# Patient Record
Sex: MALE | Race: WHITE | URBAN - METROPOLITAN AREA
[De-identification: names, ages, dates, MRNs, and addresses within clinical notes are randomized per-mention and may not be internally consistent; named-entity substitution may affect disease eponyms.]

---

## 2019-12-16 ENCOUNTER — HOSPITAL ENCOUNTER (OUTPATIENT)
Age: 54
Discharge: HOME | End: 2019-12-18

## 2019-12-16 PROCEDURE — 88305 TISSUE EXAM BY PATHOLOGIST: CPT

## 2020-05-25 ENCOUNTER — HOSPITAL ENCOUNTER (OUTPATIENT)
Dept: SLEEP CENTER | Age: 55
Discharge: HOME | End: 2020-05-25
Payer: COMMERCIAL

## 2020-05-25 PROCEDURE — G0399 HOME SLEEP TEST/TYPE 3 PORTA: HCPCS

## 2020-06-05 NOTE — PROGRESS NOTES
[address] [address]                               SLEEP STUDY REPORT    PATIENT NAME: Claudean Lazier                    :        1965  MED REC NO:   [id number]                            ROOM:  ACCOUNT NO:   [de-identified]                           ADMIT DATE: 2020  PROVIDER:     Riley Dumas    DATE OF STUDY:  2020    HOME SLEEP STUDY    REFERRING PROVIDER:  Tip Hatfield MD.    INDICATIONS FOR STUDY:  Snoring, witnessed apneas, excessive daytime  napping, and nocturnal sweating. Neck circumference is 18.5 inches. [place] sleepiness scale is 9/24 and BMI is 32.6 kg per meter squared. Home sleep study was undertaken on the morning of 2020 utilizing  an ApneaLink recording system with data including respiratory effort,  airflow, pulse oxymetry, pulse, and snoring. Total flow evaluation  period was 5 hours and 9 minutes. Total oxygen evaluation period was 5  hours and 11 minutes. RESPIRATORY DISTURBANCE ANALYSIS:  There were 178 obstructive apneas. There was 1 mixed apnea. Additionally, there were 84 hypopneas. Apnea-hypopnea index was 51.1 per hour. OXYGEN DESATURATION ANALYSIS:  There were 227 desaturations of 4% or  more. Lowest oxygen saturation was 80%. Desaturation index was 43.8  per hour. PULSE RATE ANALYSIS:  Average pulse rate for this study was 68 beats per  minute with minimum of 55 and maximum of 105 beats per minute. SNORING ANALYSIS:  There were 82 snoring events. IMPRESSION:  1. Home sleep study is compatible with severe obstructive sleep apnea. 2.  Mild-to-moderate desaturations are observed in association with  respiratory events. RECOMMENDATIONS:  1. CPAP/bilevel PAP titration. 2.  Avoid driving when sleepy. Avoid alcohol, sedatives, and narcotics,  which may make sleep apnea worse. 3.  Weight loss may be of benefit.         Barbara Cade [address]      D: 06/04/2020 7:26:13       T: 06/05/2020 1:01:31     [email or web address]  Job#: [id number]     Doc#: [id number]    CC:

## 2021-10-27 ENCOUNTER — OFFICE VISIT (OUTPATIENT)
Dept: PODIATRY | Age: 56
End: 2021-10-27
Payer: COMMERCIAL

## 2021-10-27 VITALS
BODY MASS INDEX: 32.25 KG/M2 | DIASTOLIC BLOOD PRESSURE: 82 MMHG | WEIGHT: 315 LBS | SYSTOLIC BLOOD PRESSURE: 128 MMHG | TEMPERATURE: 98.9 F

## 2021-10-27 DIAGNOSIS — M79.675 PAIN IN LEFT TOE(S): ICD-10-CM

## 2021-10-27 DIAGNOSIS — M20.42 HAMMER TOE OF LEFT FOOT: Primary | ICD-10-CM

## 2021-10-27 DIAGNOSIS — M21.612 BUNION OF GREAT TOE OF LEFT FOOT: ICD-10-CM

## 2021-10-27 PROCEDURE — 99203 OFFICE O/P NEW LOW 30 MIN: CPT | Performed by: PODIATRIST

## 2021-10-27 NOTE — PATIENT INSTRUCTIONS
Surgery will be scheduled 1/7/2022 at [facility] in [address]   They will contact you to give you all your preop instructions  Do not eat or drink anything after midnight on 1/6/2022 nothing morning of surgery  Call Kristine Burt if any issues [phone]    Someone needs to drive u to and from the surgery  Do not take nsaids like aspirin, advil  Ibuprofen tylenol is ok  Call Dr. Jazmine Contreras and set up preop clearance history and physical appointment anytime after 12/8/2021 for it to be valid the clearance has to be faxed to Kristine Burt at [phone] office note clearing you for surgery, ekg and any labs done

## 2021-10-27 NOTE — PROGRESS NOTES
[facility] PODIATRY  [address] [facility] [address]  Dept: [phone]  Dept Fax: [phone]    NEW PATIENT PROGRESS NOTE  Date of patient's visit: 10/27/2021  Patient's Name:  Tammi Tamez YOB: 1965            Patient Care Team:  Tracey Leyva MD as PCP - General        Chief Complaint   Patient presents with   [facility] Doctor     referred by another pt presents for toe pain he wear steel toed boots and it raises up and rubs    Toe Pain     nail care saw pcp Dr. Leora Nicole 10/12/2021    Foot Pain     pt wears valeria hose    Hammer Toe    Bunions       HPI  HPI:   Tammi Tamez is a 61 y.o. male who presents to the office today complaining of a bunion hammertoe left foot. This new patient is seen today regarding a bunion hammertoe. 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 .       Allergies   Allergen Reactions    Penicillins Swelling     rash    Sulfa Antibiotics Swelling     rash       Past Medical History:   Diagnosis Date    Asthma     Sleep apnea     Swelling of extremity, left     chronic left leg swelling       Prior to Admission medications    Medication Sig Start Date End Date Taking? Authorizing Provider   dapagliflozin (FARXIGA) 10 MG tablet Take 10 mg by mouth every morning   Yes Historical Provider, MD   atorvastatin (LIPITOR) 10 MG tablet Take 10 mg by mouth daily   Yes Historical Provider, MD       Past Surgical History:   Procedure Laterality Date    APPENDECTOMY      [facility].  INCISION AND DRAINAGE ABSCESS SOFT TISSUE DEEP  9/24/2011         TONSILLECTOMY  2000       No family history on file.     Social History     Tobacco Use    Smoking status: Current Every Day Smoker     Packs/day: 0.50     Years: 20.00 Pack years: 10.00     Types: Cigars    Smokeless tobacco: Never Used   Substance Use Topics    Alcohol use: Yes     Alcohol/week: 1.0 standard drinks     Types: 1 Cans of beer per week       Review of Systems    Review of Systems:   History obtained from chart review and the patient  General ROS: negative for - chills, fatigue, fever, night sweats or weight gain  Constitutional: Negative for chills, diaphoresis, fatigue, fever and unexpected weight change. Musculoskeletal: Positive for . Neurological ROS: negative for - behavioral changes, confusion, headaches or seizures. Negative for weakness and numbness. Dermatological ROS: negative for - mole changes, rash  Cardiovascular: Negative for leg swelling. Gastrointestinal: Negative for constipation, diarrhea, nausea and vomiting. Lower Extremity Physical Examination:   Vitals:   Vitals:    10/27/21 1348   BP: 128/82   Temp: 98.9 °F (37.2 °C)        Foot Exam    General  General Appearance: appears stated age and healthy   Orientation: alert and oriented to person, place, and time       Left Foot/Ankle      Inspection and Palpation  Tenderness: bony tenderness and great toe metatarsophalangeal joint   Hammertoes: second toe  Claw toes: second toe  Hallux valgus: yes  Skin Exam: skin intact; Neurovascular  Dorsalis pedis: 1+  Posterior tibial: 1+  Saphenous nerve sensation: normal  Tibial nerve sensation: normal  Superficial peroneal nerve sensation: normal  Deep peroneal nerve sensation: normal  Sural nerve sensation: normal  Achilles reflex: 2+  Babinski reflex: 2+    Muscle Strength  Ankle dorsiflexion: 5  Ankle plantar flexion: 5  Ankle inversion: 5  Ankle eversion: 5  Great toe extension: 5  Great toe flexion: 5          Left Ankle Exam     Muscle Strength   The patient has normal left ankle strength. Dorsiflexion:  5/5   Plantar flexion:  5/5             General: AAO x 3 in NAD. Dermatologic Exam:  Skin lesion/ulceration Absent . Skin No rashes or nodules noted. .   Musculoskeletal:     1st MPJ ROM within normal limits, Bilateral.    Ankle ROM within normal limits,Bilateral.   HEEL PAIN      TARSAL TUNNEL PAIN      Vascular:     Radiographs:  3 views  foot/ankle:     Asessment: Patient is a 61 y.o. male with:    Diagnosis Orders   1. Hammer toe of left foot  XR FOOT LEFT (MIN 3 VIEWS)   2. Bunion of great toe of left foot  XR FOOT LEFT (MIN 3 VIEWS)   3. Pain in left toe(s)         Plan: Patient examined and evaluated. Current condition and treatment options discussed in detail. Discussed conservative and surgical options with the patient. Treatment options today consisted of verbal and written instructions given to patient. Contact office with any questions/problems/concerns. RTC in 3week(s). I had a long discussion today with the patient about the likely diagnosis and its natural history, physical exam and imaging findings, as well as treatment options. We discussed both surgical and nonsurgical treatments, including risks and benefits. From a nonoperative standpoint, we discussed rest/activity modification, NSAIDs/Acetaminophen/topical anesthetics, orthotics, bracing/immobilization, and physical therapy. After conservative treatment has failed patient agrees and would like to have surgery today, we reviewed all aspects of the surgery including the risks the complications and the postop course there are no guarantees,   Patient agrees with surgery   we reviewed all risk and benefits all complications including anesthesia infection further surgery over under correction patient agrees to proceed with surgery. General the terms and procedures of the treatment were undertaken there was alternative procedures and methods discussed with the patient the patient declined these and opted for surgery. All the risks with the procedure were reviewed.   Patient will have surgery at [facility] in the future surgery would be a Lapidus with an Tuan guillory osteotomy arthrodesis second digit all left foot patient will be need cleared to be cleared by his primary care doc       10/27/2021    Electronically signed by Tavia Crow DPM on 10/27/2021 at 2:49 PM  10/27/2021

## 2021-10-27 NOTE — PROGRESS NOTES
[facility] PODIATRY  [address] [facility] [address]  Dept: [phone]  Dept Fax: [phone]    NEW PATIENT PROGRESS NOTE  Date of patient's visit: 10/27/2021  Patient's Name:  Kassandra Kamara YOB: 1965            Patient Care Team:  Hari Powers MD as PCP - General        Chief Complaint   Patient presents with   [facility] Doctor     referred by another pt presents for toe pain he wear steel toed boots and it raises up and rubs    Toe Pain     nail care saw pcp Dr. Chiqui Jones 10/12/2021    Foot Pain     pt wears valeria hose    Hammer Toe    Bunions       HPI  HPI:   Kassandra Kamara is a 61 y.o. male who presents to the office today complaining of ***. Symptoms began {NUMBER:65414} {TIME FRAME:20321} ago. Patient relates pain is {PRESENT/ABSENT:19725}. Pain is rated [address] [facility] 0-10:366870} out of 10 and is described as {PAIN ASSESSMENT:00812}. Treatments prior to today's visit include: ***. Currently denies F/C/N/V. Pt's primary care physician is Hari Powers MD last seen***     Allergies   Allergen Reactions    Penicillins Swelling     rash    Sulfa Antibiotics Swelling     rash       Past Medical History:   Diagnosis Date    Asthma     Sleep apnea     Swelling of extremity, left     chronic left leg swelling       Prior to Admission medications    Medication Sig Start Date End Date Taking? Authorizing Provider   dapagliflozin (FARXIGA) 10 MG tablet Take 10 mg by mouth every morning   Yes Historical Provider, MD   atorvastatin (LIPITOR) 10 MG tablet Take 10 mg by mouth daily   Yes Historical Provider, MD       Past Surgical History:   Procedure Laterality Date    APPENDECTOMY      [facility].  INCISION AND DRAINAGE ABSCESS SOFT TISSUE DEEP  9/24/2011         TONSILLECTOMY  2000       No family history on file.     Social History     Tobacco Use    Smoking status: Current Every Day Smoker     Packs/day: 0.50 Years: 20.00     Pack years: 10.00     Types: Cigars    Smokeless tobacco: Never Used   Substance Use Topics    Alcohol use: Yes     Alcohol/week: 1.0 standard drinks     Types: 1 Cans of beer per week       Review of Systems    Review of Systems:   History obtained from chart review and the patient  General ROS: negative for - chills, fatigue, fever, night sweats or weight gain  Constitutional: Negative for chills, diaphoresis, fatigue, fever and unexpected weight change. Musculoskeletal: Positive for ***. Neurological ROS: negative for - behavioral changes, confusion, headaches or seizures. Negative for weakness and numbness. Dermatological ROS: negative for - mole changes, rash  Cardiovascular: Negative for leg swelling. Gastrointestinal: Negative for constipation, diarrhea, nausea and vomiting. Lower Extremity Physical Examination:   Vitals:   Vitals:    10/27/21 1348   BP: 128/82   Temp: 98.9 °F (37.2 °C)        Foot Exam  Ortho Exam    General: AAO x 3 in NAD. Dermatologic Exam:  Skin lesion/ulceration {PRESENT/ABSENT:19725}. Skin {SKIN EXAM:20822}. Musculoskeletal:     1st MPJ ROM {DESC; NORMAL/INCREASED/DECREASED:25838}, {RIGHT LEFT BILATERAL:33159}. Ankle ROM {DESC; NORMAL/INCREASED/DECREASED:27503},{RIGHT LEFT BILATERAL:59301}. HEEL PAIN ***     TARSAL TUNNEL PAIN  ***    Vascular:     Radiographs:  3 views *** foot/ankle:     Asessment: Patient is a 61 y.o. male with:    Diagnosis Orders   1. Hammer toe of left foot  XR FOOT LEFT (MIN 3 VIEWS)   2. Bunion of great toe of left foot  XR FOOT LEFT (MIN 3 VIEWS)       Plan: Patient examined and evaluated. Current condition and treatment options discussed in detail. Discussed conservative and surgical options with the patient. Treatment options today consisted of verbal and written instructions given to patient. Contact office with any questions/problems/concerns. RTC in {Numbers; 1-10:53191}{DAYS/WEEKS/MONTHS (D):30202}. 10/27/2021    Electronically signed by Ernesto Yeboah DPM on 10/27/2021 at 2:42 PM  10/27/2021

## 2021-12-09 ENCOUNTER — PREP FOR PROCEDURE (OUTPATIENT)
Dept: PODIATRY | Age: 56
End: 2021-12-09

## 2021-12-09 RX ORDER — SODIUM CHLORIDE 0.9 % (FLUSH) 0.9 %
5-40 SYRINGE (ML) INJECTION PRN
Status: CANCELLED | OUTPATIENT
Start: 2021-12-09

## 2021-12-09 RX ORDER — SODIUM CHLORIDE 0.9 % (FLUSH) 0.9 %
5-40 SYRINGE (ML) INJECTION EVERY 12 HOURS SCHEDULED
Status: CANCELLED | OUTPATIENT
Start: 2021-12-09

## 2021-12-09 RX ORDER — SODIUM CHLORIDE 9 MG/ML
25 INJECTION, SOLUTION INTRAVENOUS PRN
Status: CANCELLED | OUTPATIENT
Start: 2021-12-09

## 2021-12-21 ENCOUNTER — OFFICE VISIT (OUTPATIENT)
Dept: PODIATRY | Age: 56
End: 2021-12-21
Payer: COMMERCIAL

## 2021-12-21 VITALS
WEIGHT: 315 LBS | DIASTOLIC BLOOD PRESSURE: 70 MMHG | BODY MASS INDEX: 32.25 KG/M2 | SYSTOLIC BLOOD PRESSURE: 132 MMHG | TEMPERATURE: 97.9 F

## 2021-12-21 DIAGNOSIS — M79.675 PAIN IN LEFT TOE(S): ICD-10-CM

## 2021-12-21 DIAGNOSIS — M21.612 BUNION OF GREAT TOE OF LEFT FOOT: Primary | ICD-10-CM

## 2021-12-21 DIAGNOSIS — M20.42 HAMMER TOE OF LEFT FOOT: ICD-10-CM

## 2021-12-21 PROCEDURE — 99213 OFFICE O/P EST LOW 20 MIN: CPT | Performed by: PODIATRIST

## 2021-12-21 NOTE — PATIENT INSTRUCTIONS
Need to schedule a preop clearance history and physical appointment with Dr. Torres [place]   He needs to do an ekg and labs and clear you in his office note for the foot surgery  No special paperwork needs filled out   Will need all that faxed to [facility]. at [phone]  Any questions call [facility]. at [phone]

## 2021-12-21 NOTE — PROGRESS NOTES
[facility]  [address] [facility] [address]  Dept: [phone]  Dept Fax: [phone]     PATIENT PROGRESS NOTE  Date of patient's visit: 12/21/2021  Patient's Name:  Marie Stewart YOB: 1965            Patient Care Team:  iDa Maya MD as PCP - General        Chief Complaint   Patient presents with    Foot Pain     preop evaluation for foot sx 1/7/22 pt cannot get in with his family dr for clearance will have to post op sx       Foot Pain   This is a chronic problem. The current episode started more than 1 year ago. HPI:   Marie Stewart is a 61 y.o. male who presents to the office today complaining of a bunion hammertoe left foot. This patient is seen today regarding a bunion hammertoe. 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 .       Allergies   Allergen Reactions    Penicillins Swelling     rash    Sulfa Antibiotics Swelling     rash       Past Medical History:   Diagnosis Date    Asthma     Sleep apnea     Swelling of extremity, left     chronic left leg swelling       Prior to Admission medications    Medication Sig Start Date End Date Taking? Authorizing Provider   dapagliflozin (FARXIGA) 10 MG tablet Take 10 mg by mouth every morning   Yes Historical Provider, MD   atorvastatin (LIPITOR) 10 MG tablet Take 10 mg by mouth daily   Yes Historical Provider, MD       Past Surgical History:   Procedure Laterality Date    APPENDECTOMY      [facility]  INCISION AND DRAINAGE ABSCESS SOFT TISSUE DEEP  9/24/2011         TONSILLECTOMY  2000       No family history on file.     Social History     Tobacco Use    Smoking status: Current Every Day Smoker     Packs/day: 0.50     Years: 20.00     Pack years: 10.00     Types: Cigars    Smokeless tobacco: Never Used   Substance Use Topics    Alcohol use: Yes     Alcohol/week: 1.0 standard drink     Types: 1 Cans of beer per week       Review of Systems    Review of Systems:   History obtained from chart review and the patient  General ROS: negative for - chills, fatigue, fever, night sweats or weight gain  Constitutional: Negative for chills, diaphoresis, fatigue, fever and unexpected weight change. Musculoskeletal: Positive for . Neurological ROS: negative for - behavioral changes, confusion, headaches or seizures. Negative for weakness and numbness. Dermatological ROS: negative for - mole changes, rash  Cardiovascular: Negative for leg swelling. Gastrointestinal: Negative for constipation, diarrhea, nausea and vomiting. Lower Extremity Physical Examination:   Vitals:   Vitals:    12/21/21 0711   BP: 132/70   Temp: 97.9 °F (36.6 °C)        Foot Exam    General  General Appearance: appears stated age and healthy   Orientation: alert and oriented to person, place, and time       Left Foot/Ankle      Inspection and Palpation  Tenderness: bony tenderness and great toe metatarsophalangeal joint   Hammertoes: second toe  Claw toes: second toe  Hallux valgus: yes  Skin Exam: skin intact; Neurovascular  Dorsalis pedis: 1+  Posterior tibial: 1+  Saphenous nerve sensation: normal  Tibial nerve sensation: normal  Superficial peroneal nerve sensation: normal  Deep peroneal nerve sensation: normal  Sural nerve sensation: normal  Achilles reflex: 2+  Babinski reflex: 2+    Muscle Strength  Ankle dorsiflexion: 5  Ankle plantar flexion: 5  Ankle inversion: 5  Ankle eversion: 5  Great toe extension: 5  Great toe flexion: 5          Left Ankle Exam     Muscle Strength   The patient has normal left ankle strength. Dorsiflexion:  5/5   Plantar flexion:  5/5             General: AAO x 3 in NAD. Dermatologic Exam:  Skin lesion/ulceration Absent . Skin No rashes or nodules noted. Ar Gutter Musculoskeletal:     1st MPJ ROM within normal limits, Bilateral.    Ankle ROM within normal limits,Bilateral.   HEEL PAIN      TARSAL TUNNEL PAIN      Vascular:     Radiographs:  3 views  foot/ankle:     Asessment: Patient is a 61 y.o. male with:    Diagnosis Orders   1. Bunion of great toe of left foot     2. Hammer toe of left foot     3. Pain in left toe(s)         Plan: Patient examined and evaluated. Current condition and treatment options discussed in detail. Discussed conservative and surgical options with the patient. Treatment options today consisted of verbal and written instructions given to patient. Contact office with any questions/problems/concerns. RTC in 3week(s). I had a long discussion today with the patient about the likely diagnosis and its natural history, physical exam and imaging findings, as well as treatment options. We discussed both surgical and nonsurgical treatments, including risks and benefits. From a nonoperative standpoint, we discussed rest/activity modification, NSAIDs/Acetaminophen/topical anesthetics, orthotics, bracing/immobilization, and physical therapy. After conservative treatment has failed patient agrees and would like to have surgery today, we reviewed all aspects of the surgery including the risks the complications and the postop course there are no guarantees,   Patient agrees with surgery   we reviewed all risk and benefits all complications including anesthesia infection further surgery over under correction patient agrees to proceed with surgery. General the terms and procedures of the treatment were undertaken there was alternative procedures and methods discussed with the patient the patient declined these and opted for surgery. All the risks with the procedure were reviewed.   Patient will have surgery at [facility] in the future surgery would be a Lapidus with an [place] osteotomy arthrodesis second digit all left foot patient will be need cleared to be cleared by his primary care doc   PT NEEDED  TO GET CLEARANCE FROM PCP       12/21/2021    Electronically signed by Maximino Nina DPM on 12/21/2021 at 7:45 AM  12/21/2021

## 2023-04-20 ENCOUNTER — HOSPITAL ENCOUNTER (EMERGENCY)
Age: 58
Discharge: HOME | End: 2023-04-20
Attending: EMERGENCY MEDICINE
Payer: COMMERCIAL

## 2023-04-20 VITALS
SYSTOLIC BLOOD PRESSURE: 124 MMHG | WEIGHT: 305 LBS | RESPIRATION RATE: 16 BRPM | HEIGHT: 78 IN | HEART RATE: 89 BPM | TEMPERATURE: 99 F | BODY MASS INDEX: 35.29 KG/M2 | DIASTOLIC BLOOD PRESSURE: 72 MMHG | OXYGEN SATURATION: 98 %

## 2023-04-20 DIAGNOSIS — M79.604 RIGHT LEG PAIN: ICD-10-CM

## 2023-04-20 DIAGNOSIS — M79.89 RIGHT LEG SWELLING: Primary | ICD-10-CM

## 2023-04-20 DIAGNOSIS — R79.89 POSITIVE D-DIMER: ICD-10-CM

## 2023-04-20 LAB
ANION GAP SERPL CALCULATED.3IONS-SCNC: 13 MMOL/L (ref 7–16)
BASOPHILS # BLD: 0.06 E9/L (ref 0–0.2)
BASOPHILS NFR BLD: 0.5 % (ref 0–2)
BNP BLD-MCNC: 38 PG/ML (ref 0–125)
BUN SERPL-MCNC: 13 MG/DL (ref 6–20)
CALCIUM SERPL-MCNC: 9.2 MG/DL (ref 8.6–10.2)
CHLORIDE SERPL-SCNC: 102 MMOL/L (ref 98–107)
CO2 SERPL-SCNC: 23 MMOL/L (ref 22–29)
CREAT SERPL-MCNC: 0.7 MG/DL (ref 0.7–1.2)
D DIMER: 3631 NG/ML DDU
EOSINOPHIL # BLD: 0.24 E9/L (ref 0.05–0.5)
EOSINOPHIL NFR BLD: 2.2 % (ref 0–6)
ERYTHROCYTE [DISTWIDTH] IN BLOOD BY AUTOMATED COUNT: 13.9 FL (ref 11.5–15)
GLUCOSE SERPL-MCNC: 118 MG/DL (ref 74–99)
HCT VFR BLD AUTO: 48.4 % (ref 37–54)
HGB BLD-MCNC: 15.9 G/DL (ref 12.5–16.5)
IMM GRANULOCYTES # BLD: 0.06 E9/L
IMM GRANULOCYTES NFR BLD: 0.5 % (ref 0–5)
LYMPHOCYTES # BLD: 2.25 E9/L (ref 1.5–4)
LYMPHOCYTES NFR BLD: 20.5 % (ref 20–42)
MCH RBC QN AUTO: 30.1 PG (ref 26–35)
MCHC RBC AUTO-ENTMCNC: 32.9 % (ref 32–34.5)
MCV RBC AUTO: 91.5 FL (ref 80–99.9)
MONOCYTES # BLD: 0.92 E9/L (ref 0.1–0.95)
MONOCYTES NFR BLD: 8.4 % (ref 2–12)
NEUTROPHILS # BLD: 7.47 E9/L (ref 1.8–7.3)
NEUTS SEG NFR BLD: 67.9 % (ref 43–80)
PLATELET # BLD AUTO: 131 E9/L (ref 130–450)
PMV BLD AUTO: 9.4 FL (ref 7–12)
POTASSIUM SERPL-SCNC: 3.9 MMOL/L (ref 3.5–5)
RBC # BLD AUTO: 5.29 E12/L (ref 3.8–5.8)
SODIUM SERPL-SCNC: 138 MMOL/L (ref 132–146)
WBC # BLD: 11 E9/L (ref 4.5–11.5)

## 2023-04-20 PROCEDURE — 6360000002 HC RX W HCPCS: Performed by: EMERGENCY MEDICINE

## 2023-04-20 PROCEDURE — 85025 COMPLETE CBC W/AUTO DIFF WBC: CPT

## 2023-04-20 PROCEDURE — 99284 EMERGENCY DEPT VISIT MOD MDM: CPT

## 2023-04-20 PROCEDURE — 85378 FIBRIN DEGRADE SEMIQUANT: CPT

## 2023-04-20 PROCEDURE — 83880 ASSAY OF NATRIURETIC PEPTIDE: CPT

## 2023-04-20 PROCEDURE — 80048 BASIC METABOLIC PNL TOTAL CA: CPT

## 2023-04-20 PROCEDURE — 96372 THER/PROPH/DIAG INJ SC/IM: CPT

## 2023-04-20 PROCEDURE — 36415 COLL VENOUS BLD VENIPUNCTURE: CPT

## 2023-04-20 RX ORDER — VALACYCLOVIR HYDROCHLORIDE 1 G/1
1000 TABLET, FILM COATED ORAL 2 TIMES DAILY
COMMUNITY

## 2023-04-20 RX ORDER — NAPROXEN 500 MG/1
500 TABLET ORAL 2 TIMES DAILY
Qty: 14 TABLET | Refills: 0 | Status: SHIPPED | OUTPATIENT
Start: 2023-04-20 | End: 2023-04-27

## 2023-04-20 RX ORDER — LISINOPRIL 5 MG/1
5 TABLET ORAL DAILY
COMMUNITY

## 2023-04-20 RX ORDER — ENOXAPARIN SODIUM 100 MG/ML
1 INJECTION SUBCUTANEOUS ONCE
Status: COMPLETED | OUTPATIENT
Start: 2023-04-20 | End: 2023-04-20

## 2023-04-20 RX ORDER — HYDROCODONE BITARTRATE AND ACETAMINOPHEN 5; 325 MG/1; MG/1
1 TABLET ORAL EVERY 6 HOURS PRN
Qty: 12 TABLET | Refills: 0 | Status: SHIPPED | OUTPATIENT
Start: 2023-04-20 | End: 2023-04-23

## 2023-04-20 RX ADMIN — ENOXAPARIN SODIUM 140 MG: 100 INJECTION SUBCUTANEOUS at 20:36

## 2023-04-20 ASSESSMENT — PAIN SCALES - GENERAL: PAINLEVEL_OUTOF10: 9

## 2023-04-20 ASSESSMENT — PAIN DESCRIPTION - LOCATION: LOCATION: LEG

## 2023-04-20 ASSESSMENT — PAIN DESCRIPTION - PAIN TYPE: TYPE: ACUTE PAIN

## 2023-04-20 ASSESSMENT — LIFESTYLE VARIABLES: HOW OFTEN DO YOU HAVE A DRINK CONTAINING ALCOHOL: MONTHLY OR LESS

## 2023-04-20 ASSESSMENT — PAIN DESCRIPTION - ORIENTATION: ORIENTATION: RIGHT;LOWER;UPPER

## 2023-04-21 ENCOUNTER — HOSPITAL ENCOUNTER (EMERGENCY)
Age: 58
Discharge: HOME | End: 2023-04-21
Attending: EMERGENCY MEDICINE
Payer: COMMERCIAL

## 2023-04-21 ENCOUNTER — TELEPHONE (OUTPATIENT)
Dept: VASCULAR SURGERY | Age: 58
End: 2023-04-21

## 2023-04-21 ENCOUNTER — HOSPITAL ENCOUNTER (OUTPATIENT)
Dept: ULTRASOUND IMAGING | Age: 58
End: 2023-04-21
Payer: COMMERCIAL

## 2023-04-21 ENCOUNTER — HOSPITAL ENCOUNTER (OUTPATIENT)
Age: 58
Discharge: HOME | End: 2023-04-21
Payer: COMMERCIAL

## 2023-04-21 VITALS
TEMPERATURE: 97.6 F | DIASTOLIC BLOOD PRESSURE: 81 MMHG | OXYGEN SATURATION: 97 % | SYSTOLIC BLOOD PRESSURE: 121 MMHG | RESPIRATION RATE: 16 BRPM | HEART RATE: 70 BPM

## 2023-04-21 DIAGNOSIS — R79.89 POSITIVE D-DIMER: ICD-10-CM

## 2023-04-21 DIAGNOSIS — M79.604 RIGHT LEG PAIN: ICD-10-CM

## 2023-04-21 DIAGNOSIS — M79.89 RIGHT LEG SWELLING: ICD-10-CM

## 2023-04-21 DIAGNOSIS — I82.462 ACUTE DEEP VEIN THROMBOSIS (DVT) OF CALF MUSCLE VEIN OF LEFT LOWER EXTREMITY (HCC): Primary | ICD-10-CM

## 2023-04-21 PROCEDURE — 99284 EMERGENCY DEPT VISIT MOD MDM: CPT

## 2023-04-21 PROCEDURE — 96372 THER/PROPH/DIAG INJ SC/IM: CPT

## 2023-04-21 PROCEDURE — 93971 EXTREMITY STUDY: CPT

## 2023-04-21 PROCEDURE — 6360000002 HC RX W HCPCS: Performed by: EMERGENCY MEDICINE

## 2023-04-21 RX ORDER — ENOXAPARIN SODIUM 150 MG/ML
1 INJECTION SUBCUTANEOUS ONCE
Status: COMPLETED | OUTPATIENT
Start: 2023-04-21 | End: 2023-04-21

## 2023-04-21 RX ADMIN — ENOXAPARIN SODIUM 135 MG: 150 INJECTION SUBCUTANEOUS at 09:37

## 2023-04-21 ASSESSMENT — ENCOUNTER SYMPTOMS
DIARRHEA: 0
BACK PAIN: 0
COUGH: 0
EYE REDNESS: 0
EYE DISCHARGE: 0
WHEEZING: 0
SORE THROAT: 0
SINUS PRESSURE: 0
NAUSEA: 0
ABDOMINAL PAIN: 0
VOMITING: 0
SHORTNESS OF BREATH: 0
EYE PAIN: 0

## 2023-04-21 ASSESSMENT — PAIN - FUNCTIONAL ASSESSMENT: PAIN_FUNCTIONAL_ASSESSMENT: 0-10

## 2023-04-21 ASSESSMENT — PAIN SCALES - GENERAL: PAINLEVEL_OUTOF10: 5

## 2023-04-21 NOTE — DISCHARGE INSTRUCTIONS
You are given an outpatient order to have a doppler ultrasound at [facility] Ultrasound Department tomorrow 4/21/2023.   This is a necessary test in order to rule out possible DVT (aka \"blood clot\")

## 2023-04-21 NOTE — ED NOTES
Discharge instructions given. Patient verbalizes understanding. No other noted or stated problems at this time. Patient will follow up with primary care and vascular surgery.      Jayson Hanson RN  04/21/23 2931

## 2023-04-21 NOTE — ED PROVIDER NOTES
not on file. The patients home medications have been reviewed. Allergies: Penicillins and Sulfa antibiotics    -------------------------------------------------- RESULTS -------------------------------------------------  Labs:  No results found for this visit on 04/21/23. Radiology:  No orders to display       ------------------------- NURSING NOTES AND VITALS REVIEWED ---------------------------  Date / Time Roomed:  4/21/2023  9:02 AM  ED Bed Assignment:  16/16    The nursing notes within the ED encounter and vital signs as below have been reviewed. /81   Pulse 70   Temp 97.6 °F (36.4 °C) (Oral)   Resp 16   SpO2 97%   Oxygen Saturation Interpretation: Normal      ------------------------------------------ PROGRESS NOTES ------------------------------------------  I have spoken with the patient and discussed todays results, in addition to providing specific details for the plan of care and counseling regarding the diagnosis and prognosis. Their questions are answered at this time and they are agreeable with the plan. I discussed at length with them reasons for immediate return here for re evaluation. They will followup with their primary care physician by calling their office on Monday.      --------------------------------- ADDITIONAL PROVIDER NOTES ---------------------------------  At this time the patient is without objective evidence of an acute process requiring hospitalization or inpatient management. They have remained hemodynamically stable throughout their entire ED visit and are stable for discharge with outpatient follow-up. The plan has been discussed in detail and they are aware of the specific conditions for emergent return, as well as the importance of follow-up. New Prescriptions    APIXABAN STARTER PACK (ELIQUIS DVT/PE STARTER PACK) 5 MG TBPK TABLET    Take 1 tablet by mouth See Admin Instructions       Diagnosis:  1.  Acute deep vein thrombosis (DVT) of calf

## 2023-04-21 NOTE — ED PROVIDER NOTES
Glom Filt Rate >60 >=60 mL/min/1.73    Calcium 9.2 8.6 - 10.2 mg/dL   Brain Natriuretic Peptide   Result Value Ref Range    Pro-BNP 38 0 - 125 pg/mL   CBC with Auto Differential   Result Value Ref Range    WBC 11.0 4.5 - 11.5 E9/L    RBC 5.29 3.80 - 5.80 E12/L    Hemoglobin 15.9 12.5 - 16.5 g/dL    Hematocrit 48.4 37.0 - 54.0 %    MCV 91.5 80.0 - 99.9 fL    MCH 30.1 26.0 - 35.0 pg    MCHC 32.9 32.0 - 34.5 %    RDW 13.9 11.5 - 15.0 fL    Platelets 033 438 - 242 E9/L    MPV 9.4 7.0 - 12.0 fL    Neutrophils % 67.9 43.0 - 80.0 %    Immature Granulocytes % 0.5 0.0 - 5.0 %    Lymphocytes % 20.5 20.0 - 42.0 %    Monocytes % 8.4 2.0 - 12.0 %    Eosinophils % 2.2 0.0 - 6.0 %    Basophils % 0.5 0.0 - 2.0 %    Neutrophils Absolute 7.47 (H) 1.80 - 7.30 E9/L    Immature Granulocytes # 0.06 E9/L    Lymphocytes Absolute 2.25 1.50 - 4.00 E9/L    Monocytes Absolute 0.92 0.10 - 0.95 E9/L    Eosinophils Absolute 0.24 0.05 - 0.50 E9/L    Basophils Absolute 0.06 0.00 - 0.20 E9/L   D-Dimer, Quantitative   Result Value Ref Range    D-Dimer, Quant 3631 ng/mL DDU       RADIOLOGY:  Interpreted by Radiologist.  No orders to display       ------------------------- NURSING NOTES AND VITALS REVIEWED ---------------------------    The nursing notes within the ED encounter and vital signs as below have been reviewed. /72   Pulse 89   Temp 99 °F (37.2 °C) (Oral)   Resp 16   Ht 6' 11\" (2.108 m)   Wt (!) 305 lb (138.3 kg)   SpO2 98%   BMI 31.13 kg/m²   Oxygen Saturation Interpretation: Normal    ---------------------------------------------------PHYSICAL EXAM--------------------------------------    Constitutional/General: Alert and oriented x3, well appearing, non toxic in mild distress  Head: Normocephalic and atraumatic  Eyes: PERRL, EOMI  Mouth: Oropharynx clear, handling secretions, no trismus  Neck: Supple, full ROM,   Pulmonary: Lungs clear to auscultation bilaterally, no wheezes, rales, or rhonchi.  Not in respiratory

## 2023-04-21 NOTE — TELEPHONE ENCOUNTER
Patient was referred to Dr. Nic Alexander from the ER for DVT.   Scheduled appointment on 6-19-23 at 8:30 am.

## 2023-04-30 ENCOUNTER — HOSPITAL ENCOUNTER (EMERGENCY)
Age: 58
Discharge: HOME | End: 2023-04-30
Attending: EMERGENCY MEDICINE
Payer: COMMERCIAL

## 2023-04-30 VITALS
HEART RATE: 75 BPM | OXYGEN SATURATION: 97 % | DIASTOLIC BLOOD PRESSURE: 70 MMHG | WEIGHT: 300 LBS | BODY MASS INDEX: 34.71 KG/M2 | TEMPERATURE: 98.4 F | HEIGHT: 78 IN | SYSTOLIC BLOOD PRESSURE: 123 MMHG | RESPIRATION RATE: 17 BRPM

## 2023-04-30 DIAGNOSIS — L03.115 CELLULITIS OF RIGHT LOWER EXTREMITY: Primary | ICD-10-CM

## 2023-04-30 DIAGNOSIS — I82.401 ACUTE DEEP VEIN THROMBOSIS (DVT) OF RIGHT LOWER EXTREMITY, UNSPECIFIED VEIN (HCC): ICD-10-CM

## 2023-04-30 LAB
ANION GAP SERPL CALCULATED.3IONS-SCNC: 9 MMOL/L (ref 7–16)
BASOPHILS # BLD: 0.09 E9/L (ref 0–0.2)
BASOPHILS NFR BLD: 1.1 % (ref 0–2)
BUN SERPL-MCNC: 17 MG/DL (ref 6–20)
CALCIUM SERPL-MCNC: 9.3 MG/DL (ref 8.6–10.2)
CHLORIDE SERPL-SCNC: 103 MMOL/L (ref 98–107)
CO2 SERPL-SCNC: 28 MMOL/L (ref 22–29)
CREAT SERPL-MCNC: 0.8 MG/DL (ref 0.7–1.2)
EOSINOPHIL # BLD: 0.38 E9/L (ref 0.05–0.5)
EOSINOPHIL NFR BLD: 4.8 % (ref 0–6)
ERYTHROCYTE [DISTWIDTH] IN BLOOD BY AUTOMATED COUNT: 13.2 FL (ref 11.5–15)
GLUCOSE SERPL-MCNC: 163 MG/DL (ref 74–99)
HCT VFR BLD AUTO: 44.1 % (ref 37–54)
HGB BLD-MCNC: 14.7 G/DL (ref 12.5–16.5)
IMM GRANULOCYTES # BLD: 0.03 E9/L
IMM GRANULOCYTES NFR BLD: 0.4 % (ref 0–5)
LACTATE BLDV-SCNC: 2 MMOL/L (ref 0.5–2.2)
LYMPHOCYTES # BLD: 2.14 E9/L (ref 1.5–4)
LYMPHOCYTES NFR BLD: 27.1 % (ref 20–42)
MCH RBC QN AUTO: 31.3 PG (ref 26–35)
MCHC RBC AUTO-ENTMCNC: 33.3 % (ref 32–34.5)
MCV RBC AUTO: 93.8 FL (ref 80–99.9)
MONOCYTES # BLD: 0.54 E9/L (ref 0.1–0.95)
MONOCYTES NFR BLD: 6.8 % (ref 2–12)
NEUTROPHILS # BLD: 4.72 E9/L (ref 1.8–7.3)
NEUTS SEG NFR BLD: 59.8 % (ref 43–80)
PLATELET # BLD AUTO: 229 E9/L (ref 130–450)
PMV BLD AUTO: 8.9 FL (ref 7–12)
POTASSIUM SERPL-SCNC: 4.3 MMOL/L (ref 3.5–5)
RBC # BLD AUTO: 4.7 E12/L (ref 3.8–5.8)
SODIUM SERPL-SCNC: 140 MMOL/L (ref 132–146)
WBC # BLD: 7.9 E9/L (ref 4.5–11.5)

## 2023-04-30 PROCEDURE — 85025 COMPLETE CBC W/AUTO DIFF WBC: CPT

## 2023-04-30 PROCEDURE — 80048 BASIC METABOLIC PNL TOTAL CA: CPT

## 2023-04-30 PROCEDURE — 99284 EMERGENCY DEPT VISIT MOD MDM: CPT

## 2023-04-30 PROCEDURE — 36415 COLL VENOUS BLD VENIPUNCTURE: CPT

## 2023-04-30 PROCEDURE — 83605 ASSAY OF LACTIC ACID: CPT

## 2023-04-30 RX ORDER — HYDROCODONE BITARTRATE AND ACETAMINOPHEN 5; 325 MG/1; MG/1
1 TABLET ORAL EVERY 6 HOURS PRN
Qty: 12 TABLET | Refills: 0 | Status: SHIPPED | OUTPATIENT
Start: 2023-04-30 | End: 2023-05-03

## 2023-04-30 RX ORDER — CEPHALEXIN 500 MG/1
500 CAPSULE ORAL 4 TIMES DAILY
Qty: 28 CAPSULE | Refills: 0 | Status: SHIPPED | OUTPATIENT
Start: 2023-04-30 | End: 2023-05-07

## 2023-06-19 ENCOUNTER — OFFICE VISIT (OUTPATIENT)
Dept: VASCULAR SURGERY | Age: 58
End: 2023-06-19
Payer: COMMERCIAL

## 2023-06-19 VITALS — WEIGHT: 305 LBS | BODY MASS INDEX: 35.29 KG/M2 | HEIGHT: 78 IN

## 2023-06-19 DIAGNOSIS — I82.491 ACUTE DEEP VEIN THROMBOSIS (DVT) OF OTHER SPECIFIED VEIN OF RIGHT LOWER EXTREMITY (HCC): Primary | ICD-10-CM

## 2023-06-19 PROCEDURE — 99203 OFFICE O/P NEW LOW 30 MIN: CPT

## 2023-06-19 RX ORDER — TADALAFIL 20 MG/1
20 TABLET ORAL PRN
COMMUNITY

## 2023-06-19 RX ORDER — HYDROCODONE BITARTRATE AND ACETAMINOPHEN 5; 325 MG/1; MG/1
1 TABLET ORAL EVERY 6 HOURS PRN
COMMUNITY

## 2024-02-15 NOTE — PROGRESS NOTES
tablet by mouth 2 times daily      naproxen (NAPROSYN) 500 MG tablet Take 1 tablet by mouth 2 times daily for 7 days 14 tablet 0    dapagliflozin (FARXIGA) 10 MG tablet Take 1 tablet by mouth every morning      atorvastatin (LIPITOR) 10 MG tablet Take 1 tablet by mouth daily       No current facility-administered medications for this visit. Allergies:  Penicillins and Sulfa antibiotics  Social History     Socioeconomic History    Marital status: Single     Spouse name: Not on file    Number of children: Not on file    Years of education: Not on file    Highest education level: Not on file   Occupational History    Not on file   Tobacco Use    Smoking status: Every Day     Packs/day: 0.50     Years: 20.00     Pack years: 10.00     Types: Cigars, Cigarettes    Smokeless tobacco: Never   Substance and Sexual Activity    Alcohol use: Yes     Alcohol/week: 1.0 standard drink     Types: 1 Cans of beer per week    Drug use: No    Sexual activity: Not on file   Other Topics Concern    Not on file   Social History Narrative    Not on file     Social Determinants of Health     Financial Resource Strain: Not on file   Food Insecurity: Not on file   Transportation Needs: Not on file   Physical Activity: Not on file   Stress: Not on file   Social Connections: Not on file   Intimate Partner Violence: Not on file   Housing Stability: Not on file     No family history on file.   - negative  for DVT   - negative  for clotting disorders  Labs  Lab Results   Component Value Date    WBC 7.9 04/30/2023    HGB 14.7 04/30/2023    HCT 44.1 04/30/2023     04/30/2023    PROTIME 11.4 09/18/2011    INR 1.0 09/18/2011    APTT 26.6 09/18/2011    K 4.3 04/30/2023    BUN 17 04/30/2023    CREATININE 0.8 04/30/2023     PHYSICAL EXAM:    Ht 6' 11\" (2.108 m)   Wt (!) 305 lb (138.3 kg)   BMI 31.13 kg/m²   CONSTITUTIONAL:   Awake, alert, cooperative  PSYCHIATRIC :  Oriented to time, place and person     Appropriate insight to disease
No

## 2024-03-27 ENCOUNTER — APPOINTMENT (OUTPATIENT)
Dept: GENERAL RADIOLOGY | Age: 59
DRG: 178 | End: 2024-03-27
Payer: COMMERCIAL

## 2024-03-27 ENCOUNTER — APPOINTMENT (OUTPATIENT)
Dept: ULTRASOUND IMAGING | Age: 59
DRG: 178 | End: 2024-03-27
Payer: COMMERCIAL

## 2024-03-27 ENCOUNTER — HOSPITAL ENCOUNTER (INPATIENT)
Age: 59
LOS: 6 days | Discharge: HOME | DRG: 178 | End: 2024-04-02
Attending: EMERGENCY MEDICINE | Admitting: INTERNAL MEDICINE
Payer: COMMERCIAL

## 2024-03-27 ENCOUNTER — APPOINTMENT (OUTPATIENT)
Dept: CT IMAGING | Age: 59
DRG: 178 | End: 2024-03-27
Payer: COMMERCIAL

## 2024-03-27 DIAGNOSIS — I82.4Y1 ACUTE DEEP VEIN THROMBOSIS (DVT) OF PROXIMAL VEIN OF RIGHT LOWER EXTREMITY (HCC): Primary | ICD-10-CM

## 2024-03-27 DIAGNOSIS — M71.21 SYNOVIAL CYST OF RIGHT POPLITEAL SPACE: ICD-10-CM

## 2024-03-27 DIAGNOSIS — U07.1 COVID: ICD-10-CM

## 2024-03-27 PROBLEM — I10 PRIMARY HYPERTENSION: Status: ACTIVE | Noted: 2024-03-27

## 2024-03-27 PROBLEM — E78.5 HYPERLIPIDEMIA: Status: ACTIVE | Noted: 2024-03-27

## 2024-03-27 PROBLEM — I82.409 DEEP VEIN THROMBOSIS (DVT) OF LOWER EXTREMITY (HCC): Status: ACTIVE | Noted: 2024-03-27

## 2024-03-27 LAB
ALBUMIN SERPL-MCNC: 4.2 G/DL (ref 3.5–5.2)
ALP SERPL-CCNC: 102 U/L (ref 40–129)
ALT SERPL-CCNC: 27 U/L (ref 0–40)
ANION GAP SERPL CALCULATED.3IONS-SCNC: 10 MMOL/L (ref 7–16)
AST SERPL-CCNC: 26 U/L (ref 0–39)
BASOPHILS # BLD: 0 K/UL (ref 0–0.2)
BASOPHILS NFR BLD: 0 % (ref 0–2)
BILIRUB SERPL-MCNC: 0.4 MG/DL (ref 0–1.2)
BUN SERPL-MCNC: 13 MG/DL (ref 6–20)
CALCIUM SERPL-MCNC: 9.3 MG/DL (ref 8.6–10.2)
CHLORIDE SERPL-SCNC: 102 MMOL/L (ref 98–107)
CO2 SERPL-SCNC: 25 MMOL/L (ref 22–29)
CREAT SERPL-MCNC: 0.9 MG/DL (ref 0.7–1.2)
CRP SERPL HS-MCNC: 4 MG/L (ref 0–5)
EKG ATRIAL RATE: 85 BPM
EKG P AXIS: 77 DEGREES
EKG P-R INTERVAL: 180 MS
EKG Q-T INTERVAL: 318 MS
EKG QRS DURATION: 80 MS
EKG QTC CALCULATION (BAZETT): 378 MS
EKG R AXIS: 83 DEGREES
EKG T AXIS: 77 DEGREES
EKG VENTRICULAR RATE: 85 BPM
EOSINOPHIL # BLD: 0.08 K/UL (ref 0.05–0.5)
EOSINOPHILS RELATIVE PERCENT: 2 % (ref 0–6)
ERYTHROCYTE [DISTWIDTH] IN BLOOD BY AUTOMATED COUNT: 14.2 % (ref 11.5–15)
ERYTHROCYTE [SEDIMENTATION RATE] IN BLOOD BY WESTERGREN METHOD: 5 MM/HR (ref 0–15)
GFR SERPL CREATININE-BSD FRML MDRD: >90 ML/MIN/1.73M2
GLUCOSE SERPL-MCNC: 125 MG/DL (ref 74–99)
HCT VFR BLD AUTO: 46.3 % (ref 37–54)
HGB BLD-MCNC: 15.4 G/DL (ref 12.5–16.5)
INFLUENZA A BY PCR: NOT DETECTED
INFLUENZA B BY PCR: NOT DETECTED
LACTATE BLDV-SCNC: 1.8 MMOL/L (ref 0.5–2.2)
LYMPHOCYTES NFR BLD: 0.33 K/UL (ref 1.5–4)
LYMPHOCYTES RELATIVE PERCENT: 7 % (ref 20–42)
MCH RBC QN AUTO: 32 PG (ref 26–35)
MCHC RBC AUTO-ENTMCNC: 33.3 G/DL (ref 32–34.5)
MCV RBC AUTO: 96.1 FL (ref 80–99.9)
MONOCYTES NFR BLD: 0.21 K/UL (ref 0.1–0.95)
MONOCYTES NFR BLD: 4 % (ref 2–12)
NEUTROPHILS NFR BLD: 87 % (ref 43–80)
NEUTS SEG NFR BLD: 4.08 K/UL (ref 1.8–7.3)
PLATELET, FLUORESCENCE: 134 K/UL (ref 130–450)
PMV BLD AUTO: 9.6 FL (ref 7–12)
POTASSIUM SERPL-SCNC: 4.2 MMOL/L (ref 3.5–5)
PROCALCITONIN SERPL-MCNC: 0.06 NG/ML (ref 0–0.08)
PROT SERPL-MCNC: 6.7 G/DL (ref 6.4–8.3)
RBC # BLD AUTO: 4.82 M/UL (ref 3.8–5.8)
RBC # BLD: NORMAL 10*6/UL
SARS-COV-2 RDRP RESP QL NAA+PROBE: DETECTED
SODIUM SERPL-SCNC: 137 MMOL/L (ref 132–146)
SPECIMEN DESCRIPTION: ABNORMAL
TROPONIN I SERPL HS-MCNC: 9 NG/L (ref 0–11)
WBC OTHER # BLD: 4.7 K/UL (ref 4.5–11.5)

## 2024-03-27 PROCEDURE — 71275 CT ANGIOGRAPHY CHEST: CPT

## 2024-03-27 PROCEDURE — 99285 EMERGENCY DEPT VISIT HI MDM: CPT

## 2024-03-27 PROCEDURE — 6370000000 HC RX 637 (ALT 250 FOR IP): Performed by: INTERNAL MEDICINE

## 2024-03-27 PROCEDURE — 80053 COMPREHEN METABOLIC PANEL: CPT

## 2024-03-27 PROCEDURE — 93971 EXTREMITY STUDY: CPT

## 2024-03-27 PROCEDURE — 71045 X-RAY EXAM CHEST 1 VIEW: CPT

## 2024-03-27 PROCEDURE — 93005 ELECTROCARDIOGRAM TRACING: CPT

## 2024-03-27 PROCEDURE — 6370000000 HC RX 637 (ALT 250 FOR IP): Performed by: EMERGENCY MEDICINE

## 2024-03-27 PROCEDURE — 87635 SARS-COV-2 COVID-19 AMP PRB: CPT

## 2024-03-27 PROCEDURE — 6360000004 HC RX CONTRAST MEDICATION: Performed by: RADIOLOGY

## 2024-03-27 PROCEDURE — APPSS45 APP SPLIT SHARED TIME 31-45 MINUTES: Performed by: NURSE PRACTITIONER

## 2024-03-27 PROCEDURE — 2580000003 HC RX 258

## 2024-03-27 PROCEDURE — 84145 PROCALCITONIN (PCT): CPT

## 2024-03-27 PROCEDURE — 93010 ELECTROCARDIOGRAM REPORT: CPT | Performed by: INTERNAL MEDICINE

## 2024-03-27 PROCEDURE — 6360000002 HC RX W HCPCS: Performed by: EMERGENCY MEDICINE

## 2024-03-27 PROCEDURE — 87502 INFLUENZA DNA AMP PROBE: CPT

## 2024-03-27 PROCEDURE — 85652 RBC SED RATE AUTOMATED: CPT

## 2024-03-27 PROCEDURE — 84484 ASSAY OF TROPONIN QUANT: CPT

## 2024-03-27 PROCEDURE — 99223 1ST HOSP IP/OBS HIGH 75: CPT | Performed by: INTERNAL MEDICINE

## 2024-03-27 PROCEDURE — 96372 THER/PROPH/DIAG INJ SC/IM: CPT

## 2024-03-27 PROCEDURE — 85025 COMPLETE CBC W/AUTO DIFF WBC: CPT

## 2024-03-27 PROCEDURE — 1200000000 HC SEMI PRIVATE

## 2024-03-27 PROCEDURE — 86140 C-REACTIVE PROTEIN: CPT

## 2024-03-27 PROCEDURE — 83605 ASSAY OF LACTIC ACID: CPT

## 2024-03-27 RX ORDER — ACETAMINOPHEN 325 MG/1
650 TABLET ORAL ONCE
Status: COMPLETED | OUTPATIENT
Start: 2024-03-27 | End: 2024-03-27

## 2024-03-27 RX ORDER — ONDANSETRON 4 MG/1
4 TABLET, ORALLY DISINTEGRATING ORAL EVERY 8 HOURS PRN
Status: DISCONTINUED | OUTPATIENT
Start: 2024-03-27 | End: 2024-04-02 | Stop reason: HOSPADM

## 2024-03-27 RX ORDER — ACETAMINOPHEN 325 MG/1
650 TABLET ORAL EVERY 6 HOURS PRN
Status: DISCONTINUED | OUTPATIENT
Start: 2024-03-27 | End: 2024-04-02 | Stop reason: HOSPADM

## 2024-03-27 RX ORDER — SODIUM CHLORIDE 0.9 % (FLUSH) 0.9 %
5-40 SYRINGE (ML) INJECTION EVERY 12 HOURS SCHEDULED
Status: DISCONTINUED | OUTPATIENT
Start: 2024-03-27 | End: 2024-04-02 | Stop reason: HOSPADM

## 2024-03-27 RX ORDER — VALACYCLOVIR HYDROCHLORIDE 1 G/1
1000 TABLET, FILM COATED ORAL DAILY PRN
Status: DISCONTINUED | OUTPATIENT
Start: 2024-03-27 | End: 2024-04-02 | Stop reason: HOSPADM

## 2024-03-27 RX ORDER — POLYETHYLENE GLYCOL 3350 17 G/17G
17 POWDER, FOR SOLUTION ORAL DAILY PRN
Status: DISCONTINUED | OUTPATIENT
Start: 2024-03-27 | End: 2024-04-02 | Stop reason: HOSPADM

## 2024-03-27 RX ORDER — HYDROCODONE BITARTRATE AND ACETAMINOPHEN 5; 325 MG/1; MG/1
1 TABLET ORAL EVERY 6 HOURS PRN
Status: DISCONTINUED | OUTPATIENT
Start: 2024-03-27 | End: 2024-04-02 | Stop reason: HOSPADM

## 2024-03-27 RX ORDER — 0.9 % SODIUM CHLORIDE 0.9 %
1000 INTRAVENOUS SOLUTION INTRAVENOUS ONCE
Status: COMPLETED | OUTPATIENT
Start: 2024-03-27 | End: 2024-03-27

## 2024-03-27 RX ORDER — ONDANSETRON 2 MG/ML
4 INJECTION INTRAMUSCULAR; INTRAVENOUS EVERY 6 HOURS PRN
Status: DISCONTINUED | OUTPATIENT
Start: 2024-03-27 | End: 2024-04-02 | Stop reason: HOSPADM

## 2024-03-27 RX ORDER — SODIUM CHLORIDE 0.9 % (FLUSH) 0.9 %
5-40 SYRINGE (ML) INJECTION PRN
Status: DISCONTINUED | OUTPATIENT
Start: 2024-03-27 | End: 2024-04-02 | Stop reason: HOSPADM

## 2024-03-27 RX ORDER — ENOXAPARIN SODIUM 150 MG/ML
1 INJECTION SUBCUTANEOUS ONCE
Status: COMPLETED | OUTPATIENT
Start: 2024-03-27 | End: 2024-03-27

## 2024-03-27 RX ORDER — SODIUM CHLORIDE 9 MG/ML
INJECTION, SOLUTION INTRAVENOUS PRN
Status: DISCONTINUED | OUTPATIENT
Start: 2024-03-27 | End: 2024-04-02 | Stop reason: HOSPADM

## 2024-03-27 RX ORDER — ACETAMINOPHEN 650 MG/1
650 SUPPOSITORY RECTAL EVERY 6 HOURS PRN
Status: DISCONTINUED | OUTPATIENT
Start: 2024-03-27 | End: 2024-04-02 | Stop reason: HOSPADM

## 2024-03-27 RX ADMIN — ENOXAPARIN SODIUM 135 MG: 150 INJECTION SUBCUTANEOUS at 15:41

## 2024-03-27 RX ADMIN — HYDROCODONE BITARTRATE AND ACETAMINOPHEN 1 TABLET: 5; 325 TABLET ORAL at 20:27

## 2024-03-27 RX ADMIN — ACETAMINOPHEN 650 MG: 325 TABLET ORAL at 13:31

## 2024-03-27 RX ADMIN — SODIUM CHLORIDE 1000 ML: 9 INJECTION, SOLUTION INTRAVENOUS at 11:40

## 2024-03-27 RX ADMIN — IOPAMIDOL 75 ML: 755 INJECTION, SOLUTION INTRAVENOUS at 14:04

## 2024-03-27 ASSESSMENT — PAIN DESCRIPTION - DESCRIPTORS
DESCRIPTORS: ACHING
DESCRIPTORS: ACHING

## 2024-03-27 ASSESSMENT — PAIN SCALES - GENERAL
PAINLEVEL_OUTOF10: 5
PAINLEVEL_OUTOF10: 7
PAINLEVEL_OUTOF10: 5
PAINLEVEL_OUTOF10: 4

## 2024-03-27 ASSESSMENT — PAIN - FUNCTIONAL ASSESSMENT
PAIN_FUNCTIONAL_ASSESSMENT: 0-10
PAIN_FUNCTIONAL_ASSESSMENT: 0-10

## 2024-03-27 ASSESSMENT — LIFESTYLE VARIABLES
HOW OFTEN DO YOU HAVE A DRINK CONTAINING ALCOHOL: MONTHLY OR LESS
HOW MANY STANDARD DRINKS CONTAINING ALCOHOL DO YOU HAVE ON A TYPICAL DAY: 1 OR 2

## 2024-03-27 ASSESSMENT — PAIN DESCRIPTION - LOCATION
LOCATION: LEG
LOCATION: GENERALIZED

## 2024-03-27 ASSESSMENT — PAIN DESCRIPTION - ORIENTATION: ORIENTATION: RIGHT;LEFT

## 2024-03-27 NOTE — ED PROVIDER NOTES
Department of Emergency Medicine   ED Provider Note  Admit Date/RoomTime: 3/27/2024 10:43 AM  ED Room: 0516/0516-A          History of Present Illness:  3/27/24, Time: 11:17 AM EDT       Pa Lugo is a 58 y.o. male presenting to the ED for chills, pain behind his knee and bodyaches.  Complaining of chest pain, abdominal pain, chills, ear pain, pressure in his ears, congestion, cough, and pain behind his right knee.  Notes the pain behind his right knee feels similar to when he had a DVT in the past however he is on Eliquis and has not missed any doses. He denies any fevers, dysuria, hematuria, leg swelling, constipation or diarrhea. Notes sx all onset last night while laying down for bed.     Review of Systems:     Pertinent positives and negatives are stated within HPI.      --------------------------------------------- PAST HISTORY ---------------------------------------------  Past Medical History:  has a past medical history of Asthma, Sleep apnea, and Swelling of extremity, left.    Past Surgical History:  has a past surgical history that includes Appendectomy; Tonsillectomy (01/01/2000); Inc And Drain Abscess Soft Tiss Deep (09/24/2011); Cataract extraction; and Dental surgery.    Social History:  reports that he has been smoking cigars and cigarettes. He has a 10.0 pack-year smoking history. He has never used smokeless tobacco. He reports current alcohol use of about 1.0 standard drink of alcohol per week. He reports that he does not use drugs.    Family History: family history is not on file.     The patient’s home medications have been reviewed.    Allergies: Pcn [penicillins] and Sulfa antibiotics      ---------------------------------------------------PHYSICAL EXAM--------------------------------------    Physical Exam  Vitals and nursing note reviewed.   Constitutional:       General: He is not in acute distress.     Appearance: Normal appearance.   HENT:      Head: Normocephalic and atraumatic.

## 2024-03-27 NOTE — PROGRESS NOTES
4 Eyes Skin Assessment     NAME:  Pa Lugo  YOB: 1965  MEDICAL RECORD NUMBER:  [id number]    The patient is being assessed for  Admission    I agree that at least one RN has performed a thorough Head to Toe Skin Assessment on the patient. ALL assessment sites listed below have been assessed.      Areas assessed by both nurses:    Head, Face, Ears, Shoulders, Back, Chest, Arms, Elbows, Hands, Sacrum. Buttock, Coccyx, Ischium, Legs. Feet and Heels, and Under Medical Devices         Does the Patient have a Wound? No noted wound(s)       Ganesh Prevention initiated by RN: No  Wound Care Orders initiated by RN: No    Pressure Injury (Stage 3,4, Unstageable, DTI, NWPT, and Complex wounds) if present, place Wound referral order by RN under : No    BLE Vascular discoloration, boggy heels, redness to bony prominence on b/l feet    New Ostomies, if present place, Ostomy referral order under : No     Nurse 1 eSignature: Electronically signed by Courtney Alvarez RN on 3/27/24 at 7:33 PM EDT    **SHARE this note so that the co-signing nurse can place an eSignature**    Nurse 2 eSignature: Electronically signed by Shirin Weiss RN on 3/27/24 at 8:35 PM EDT

## 2024-03-27 NOTE — ED NOTES
ED to Inpatient Handoff Report    Notified 5W Nadege that electronic handoff available and patient ready for transport to room 516.    Safety Risks: None identified    Patient in Restraints: no    Constant Observer or Patient : no    Telemetry Monitoring Ordered: No          Order to transfer to unit without monitor: NA    Last MEWS: 1 Time completed: 1755    Deterioration Index: 17.26    Vitals:    03/27/24 1041 03/27/24 1544 03/27/24 1642 03/27/24 1755   BP: 135/70 116/66 110/66 127/74   Pulse: 87 80  75   Resp: 16 16  16   Temp: 100 °F (37.8 °C)   99.4 °F (37.4 °C)   TempSrc: Oral   Oral   SpO2: 100% 97% 95% 96%   Weight: 129.3 kg (285 lb)      Height: 2.108 m (6' 11\")          Opportunity for questions and clarification was provided.

## 2024-03-27 NOTE — H&P
[facility]ist [facility]   History and Physical      CHIEF COMPLAINT: SOB/Chills/Pain behind Knees/Bodyaches.      History of Present Illness:  58 y.o. male with a history of Asthma, PATSY  presents with SOB/Chills/Pain behind Knees/Bodyaches. Pt states he developed CP, Abdominal Pain, Chills, Ear pain/pressure, Congestion, cough, and pain behind his right knee. States he is currently on Eliquis and is compliant with medication.Pt states symptom onset about 1am this morning.Pt received  Acetaminophen 650mg, Lovenox 135mg SC, 1L NSS bolus in ED course. Decision to admit pt for further evaluation and treatment.      Informant(s) for H&P:Pt and EMR    REVIEW OF SYSTEMS:  Admits to SOB/Chills/Pain behind Knees/Bodyaches. Denies fevers, cp, n/v, ha, vision/hearing changes, wt changes, hot/cold flashes, other open skin lesions, diarrhea, constipation, dysuria/hematuria unless noted in HPI. Complete ROS performed with the patient and is otherwise negative.      PMH:  Past Medical History:   Diagnosis Date    Asthma     Sleep apnea     Swelling of extremity, left     chronic left leg swelling       Surgical History:  Past Surgical History:   Procedure Laterality Date    APPENDECTOMY      CATARACT EXTRACTION      DENTAL SURGERY      teet removed    HC  INCISION AND DRAINAGE ABSCESS SOFT TISSUE DEEP  09/24/2011         TONSILLECTOMY  01/01/2000       Medications Prior to Admission:    Prior to Admission medications    Medication Sig Start Date End Date Taking? Authorizing Provider   HYDROcodone-acetaminophen (NORCO) 5-325 MG per tablet Take 1 tablet by mouth every 6 hours as needed for Pain. Max Daily Amount: 4 tablets    ProviderStephen MD   tadalafil (CIALIS) 20 MG tablet Take 1 tablet by mouth as needed for Erectile Dysfunction    ProviderStephen MD   Elastic Bandages & Supports (JOBST KNEE HIGH COMPRESSION SM) MISC Compression stockings 20-30 mm hg knee high bilaterally  Dx : Venous

## 2024-03-28 ENCOUNTER — APPOINTMENT (OUTPATIENT)
Dept: CT IMAGING | Age: 59
DRG: 178 | End: 2024-03-28
Payer: COMMERCIAL

## 2024-03-28 PROBLEM — I82.401 ACUTE DEEP VEIN THROMBOSIS (DVT) OF RIGHT LOWER EXTREMITY (HCC): Status: ACTIVE | Noted: 2024-03-27

## 2024-03-28 LAB
ALBUMIN SERPL-MCNC: 4 G/DL (ref 3.5–5.2)
ALP SERPL-CCNC: 87 U/L (ref 40–129)
ALT SERPL-CCNC: 28 U/L (ref 0–40)
ANION GAP SERPL CALCULATED.3IONS-SCNC: 10 MMOL/L (ref 7–16)
AST SERPL-CCNC: 26 U/L (ref 0–39)
BASOPHILS # BLD: 0.02 K/UL (ref 0–0.2)
BASOPHILS NFR BLD: 1 % (ref 0–2)
BILIRUB SERPL-MCNC: 0.3 MG/DL (ref 0–1.2)
BUN SERPL-MCNC: 15 MG/DL (ref 6–20)
CALCIUM SERPL-MCNC: 8.4 MG/DL (ref 8.6–10.2)
CHLORIDE SERPL-SCNC: 103 MMOL/L (ref 98–107)
CO2 SERPL-SCNC: 21 MMOL/L (ref 22–29)
CREAT SERPL-MCNC: 0.8 MG/DL (ref 0.7–1.2)
D DIMER: 201 NG/ML DDU (ref 0–232)
EOSINOPHIL # BLD: 0.01 K/UL (ref 0.05–0.5)
EOSINOPHILS RELATIVE PERCENT: 0 % (ref 0–6)
ERYTHROCYTE [DISTWIDTH] IN BLOOD BY AUTOMATED COUNT: 14.6 % (ref 11.5–15)
GFR SERPL CREATININE-BSD FRML MDRD: >90 ML/MIN/1.73M2
GLUCOSE BLD-MCNC: 105 MG/DL (ref 74–99)
GLUCOSE BLD-MCNC: 113 MG/DL (ref 74–99)
GLUCOSE BLD-MCNC: 169 MG/DL (ref 74–99)
GLUCOSE SERPL-MCNC: 136 MG/DL (ref 74–99)
HCT VFR BLD AUTO: 43.9 % (ref 37–54)
HCYS SERPL-SCNC: 11.5 UMOL/L (ref 0–15)
HGB BLD-MCNC: 14.5 G/DL (ref 12.5–16.5)
IMM GRANULOCYTES # BLD AUTO: <0.03 K/UL (ref 0–0.58)
IMM GRANULOCYTES NFR BLD: 0 % (ref 0–5)
INR PPP: 1
LACTATE BLDV-SCNC: 0.6 MMOL/L (ref 0.5–2.2)
LYMPHOCYTES NFR BLD: 0.99 K/UL (ref 1.5–4)
LYMPHOCYTES RELATIVE PERCENT: 28 % (ref 20–42)
MCH RBC QN AUTO: 31.5 PG (ref 26–35)
MCHC RBC AUTO-ENTMCNC: 33 G/DL (ref 32–34.5)
MCV RBC AUTO: 95.2 FL (ref 80–99.9)
MONOCYTES NFR BLD: 0.42 K/UL (ref 0.1–0.95)
MONOCYTES NFR BLD: 12 % (ref 2–12)
NEUTROPHILS NFR BLD: 60 % (ref 43–80)
NEUTS SEG NFR BLD: 2.14 K/UL (ref 1.8–7.3)
PARTIAL THROMBOPLASTIN TIME: 31.6 SEC (ref 24.5–35.1)
PHOSPHATE SERPL-MCNC: 3.3 MG/DL (ref 2.5–4.5)
PLATELET # BLD AUTO: 101 K/UL (ref 130–450)
PMV BLD AUTO: 10 FL (ref 7–12)
POTASSIUM SERPL-SCNC: 4 MMOL/L (ref 3.5–5)
PROT SERPL-MCNC: 6.4 G/DL (ref 6.4–8.3)
PROTHROMBIN TIME: 11.5 SEC (ref 9.3–12.4)
RBC # BLD AUTO: 4.61 M/UL (ref 3.8–5.8)
SODIUM SERPL-SCNC: 134 MMOL/L (ref 132–146)
WBC OTHER # BLD: 3.6 K/UL (ref 4.5–11.5)

## 2024-03-28 PROCEDURE — 6370000000 HC RX 637 (ALT 250 FOR IP): Performed by: INTERNAL MEDICINE

## 2024-03-28 PROCEDURE — 85300 ANTITHROMBIN III ACTIVITY: CPT

## 2024-03-28 PROCEDURE — 82962 GLUCOSE BLOOD TEST: CPT

## 2024-03-28 PROCEDURE — 83605 ASSAY OF LACTIC ACID: CPT

## 2024-03-28 PROCEDURE — 83090 ASSAY OF HOMOCYSTEINE: CPT

## 2024-03-28 PROCEDURE — 84100 ASSAY OF PHOSPHORUS: CPT

## 2024-03-28 PROCEDURE — 85379 FIBRIN DEGRADATION QUANT: CPT

## 2024-03-28 PROCEDURE — 86147 CARDIOLIPIN ANTIBODY EA IG: CPT

## 2024-03-28 PROCEDURE — 80053 COMPREHEN METABOLIC PANEL: CPT

## 2024-03-28 PROCEDURE — 85302 CLOT INHIBIT PROT C ANTIGEN: CPT

## 2024-03-28 PROCEDURE — 85730 THROMBOPLASTIN TIME PARTIAL: CPT

## 2024-03-28 PROCEDURE — 1200000000 HC SEMI PRIVATE

## 2024-03-28 PROCEDURE — 6360000002 HC RX W HCPCS: Performed by: STUDENT IN AN ORGANIZED HEALTH CARE EDUCATION/TRAINING PROGRAM

## 2024-03-28 PROCEDURE — 36415 COLL VENOUS BLD VENIPUNCTURE: CPT

## 2024-03-28 PROCEDURE — 81400 MOPATH PROCEDURE LEVEL 1: CPT

## 2024-03-28 PROCEDURE — 2580000003 HC RX 258: Performed by: INTERNAL MEDICINE

## 2024-03-28 PROCEDURE — 6370000000 HC RX 637 (ALT 250 FOR IP): Performed by: STUDENT IN AN ORGANIZED HEALTH CARE EDUCATION/TRAINING PROGRAM

## 2024-03-28 PROCEDURE — 81291 MTHFR GENE: CPT

## 2024-03-28 PROCEDURE — 74177 CT ABD & PELVIS W/CONTRAST: CPT

## 2024-03-28 PROCEDURE — 81240 F2 GENE: CPT

## 2024-03-28 PROCEDURE — 85306 CLOT INHIBIT PROT S FREE: CPT

## 2024-03-28 PROCEDURE — 81241 F5 GENE: CPT

## 2024-03-28 PROCEDURE — 85610 PROTHROMBIN TIME: CPT

## 2024-03-28 PROCEDURE — 6370000000 HC RX 637 (ALT 250 FOR IP): Performed by: CLINICAL NURSE SPECIALIST

## 2024-03-28 PROCEDURE — 6360000004 HC RX CONTRAST MEDICATION: Performed by: RADIOLOGY

## 2024-03-28 PROCEDURE — 85025 COMPLETE CBC W/AUTO DIFF WBC: CPT

## 2024-03-28 PROCEDURE — 99232 SBSQ HOSP IP/OBS MODERATE 35: CPT | Performed by: STUDENT IN AN ORGANIZED HEALTH CARE EDUCATION/TRAINING PROGRAM

## 2024-03-28 RX ORDER — WARFARIN SODIUM 5 MG/1
10 TABLET ORAL
Status: COMPLETED | OUTPATIENT
Start: 2024-03-28 | End: 2024-03-28

## 2024-03-28 RX ORDER — LISINOPRIL 5 MG/1
5 TABLET ORAL EVERY MORNING
Status: DISCONTINUED | OUTPATIENT
Start: 2024-03-28 | End: 2024-04-02 | Stop reason: HOSPADM

## 2024-03-28 RX ORDER — GLUCAGON 1 MG/ML
1 KIT INJECTION PRN
Status: DISCONTINUED | OUTPATIENT
Start: 2024-03-28 | End: 2024-04-02 | Stop reason: HOSPADM

## 2024-03-28 RX ORDER — INSULIN LISPRO 100 [IU]/ML
0-4 INJECTION, SOLUTION INTRAVENOUS; SUBCUTANEOUS
Status: DISCONTINUED | OUTPATIENT
Start: 2024-03-28 | End: 2024-04-02 | Stop reason: HOSPADM

## 2024-03-28 RX ORDER — DEXTROSE MONOHYDRATE 100 MG/ML
INJECTION, SOLUTION INTRAVENOUS CONTINUOUS PRN
Status: DISCONTINUED | OUTPATIENT
Start: 2024-03-28 | End: 2024-04-02 | Stop reason: HOSPADM

## 2024-03-28 RX ORDER — ATORVASTATIN CALCIUM 20 MG/1
20 TABLET, FILM COATED ORAL EVERY MORNING
Status: DISCONTINUED | OUTPATIENT
Start: 2024-03-28 | End: 2024-04-02 | Stop reason: HOSPADM

## 2024-03-28 RX ORDER — INSULIN LISPRO 100 [IU]/ML
0-4 INJECTION, SOLUTION INTRAVENOUS; SUBCUTANEOUS NIGHTLY
Status: DISCONTINUED | OUTPATIENT
Start: 2024-03-28 | End: 2024-04-02 | Stop reason: HOSPADM

## 2024-03-28 RX ORDER — ENOXAPARIN SODIUM 150 MG/ML
130 INJECTION SUBCUTANEOUS 2 TIMES DAILY
Status: DISCONTINUED | OUTPATIENT
Start: 2024-03-28 | End: 2024-04-02 | Stop reason: HOSPADM

## 2024-03-28 RX ADMIN — WARFARIN SODIUM 10 MG: 5 TABLET ORAL at 18:55

## 2024-03-28 RX ADMIN — HYDROCODONE BITARTRATE AND ACETAMINOPHEN 1 TABLET: 5; 325 TABLET ORAL at 12:01

## 2024-03-28 RX ADMIN — ENOXAPARIN SODIUM 130 MG: 150 INJECTION SUBCUTANEOUS at 12:11

## 2024-03-28 RX ADMIN — IOPAMIDOL 18 ML: 755 INJECTION, SOLUTION INTRAVENOUS at 17:30

## 2024-03-28 RX ADMIN — HYDROCODONE BITARTRATE AND ACETAMINOPHEN 1 TABLET: 5; 325 TABLET ORAL at 05:10

## 2024-03-28 RX ADMIN — ENOXAPARIN SODIUM 130 MG: 150 INJECTION SUBCUTANEOUS at 21:16

## 2024-03-28 RX ADMIN — IOPAMIDOL 75 ML: 755 INJECTION, SOLUTION INTRAVENOUS at 17:31

## 2024-03-28 RX ADMIN — SODIUM CHLORIDE, PRESERVATIVE FREE 10 ML: 5 INJECTION INTRAVENOUS at 21:16

## 2024-03-28 RX ADMIN — LISINOPRIL 5 MG: 5 TABLET ORAL at 12:01

## 2024-03-28 RX ADMIN — ATORVASTATIN CALCIUM 20 MG: 20 TABLET, FILM COATED ORAL at 12:01

## 2024-03-28 RX ADMIN — HYDROCODONE BITARTRATE AND ACETAMINOPHEN 1 TABLET: 5; 325 TABLET ORAL at 18:20

## 2024-03-28 RX ADMIN — SODIUM CHLORIDE, PRESERVATIVE FREE 10 ML: 5 INJECTION INTRAVENOUS at 12:00

## 2024-03-28 ASSESSMENT — PAIN DESCRIPTION - LOCATION: LOCATION: LEG;BACK

## 2024-03-28 ASSESSMENT — PAIN SCALES - GENERAL: PAINLEVEL_OUTOF10: 8

## 2024-03-28 ASSESSMENT — PAIN DESCRIPTION - DESCRIPTORS: DESCRIPTORS: ACHING

## 2024-03-28 ASSESSMENT — PAIN DESCRIPTION - ORIENTATION: ORIENTATION: RIGHT;LEFT

## 2024-03-28 NOTE — PROGRESS NOTES
Pharmacy Consultation Note  (Warfarin Dosing and Monitoring)    Initial consult date: 3/28  Consulting Provider: Marisela Silvestre MOLLY Lugo is a 58 y.o. male for whom pharmacy has been asked to manage warfarin therapy.     Weight:   Wt Readings from Last 1 Encounters:   03/28/24 132.4 kg (291 lb 12.8 oz)       TSH:  No results found for: \"TSH\"    Hepatic Function Panel:                            Lab Results   Component Value Date/Time    ALKPHOS 87 03/28/2024 06:56 AM    ALT 28 03/28/2024 06:56 AM    AST 26 03/28/2024 06:56 AM    PROT 6.4 03/28/2024 06:56 AM    BILITOT 0.3 03/28/2024 06:56 AM    BILIDIR 0.2 07/13/2011 09:25 PM    LABALBU 4.0 03/28/2024 06:56 AM    LABALBU 4.3 09/21/2011 06:00 AM           Recent Labs     03/27/24  1137 03/28/24  0656   HGB 15.4 14.5   PLT  --  101*     Date Warfarin Dose INR Heparin or LMWH Comment   3/28 10 mg 1.0 Enoxaparin 130 mg subQ twice daily                                  Assessment:  Patient is a 58 y.o. male on warfarin for New Venous Thromboembolism.  Patient was previously on apixaban, but will now is on therapeutic enoxaparin to be bridged to warfarin.  Goal INR 2 - 3  INR 1.0 today    Plan:  Warfarin 10 mg tonight  Daily PT/INR until the INR is stable within the therapeutic range  Pharmacist will follow and monitor/adjust dosing as necessary      Rakan Serna RPH 3/28/2024 3:00 PM    SEB: [phone]  SEY: [phone]  SJW: [phone]

## 2024-03-28 NOTE — CONSULTS
thickening in the right lower lobe and left lower  lobe may be related to chronic bronchitis.                              IMPRESSION/PLAN      Extensive DVT right lower extremity Hx of LLL DVT treated with short course of coumadin , was to be on asa  stopped tatking , right DVT dx on 4/10/2023 placed on eliquis per pt compliant with Eliquis no missed doses   , no hematological work up done   Currently receiving  lovenox 130 mg BID SQ, will  bridge to coumadin pharmacy to dose with goal 2-3 , current weight today per nursing is 291 lbs   +COVID 19 infection   10-pack-year smoking history currently smoking cigars does inhale decline smoking cessation at this time   Orders placed for D-dimer, PT/INR PTT, thrombophilia panel, protein C&S, homocystine, AT 3 activity, cardiolipin AB IgG IgM IgA\  Pt is 6'11 large body mass ,  will have nursing obtain accurate weight  documented weight  130 lbs at this admission likely incorrect  , weight in epic on  4/2023 300lbs   Consult vascular surgery for extensive right DVT , vascular discoloration b/l LE   Reviewed CTA pulm no PE no evidence of mediastinal lymphadenopathy or masses , will check  CT abdomen and pelvis recurrent DVT on DOAC r/o lymphadenopathy  or mass      Mariselasa Silvestre APRN, ACNS-BC,AGACNP-BC   [facility]  [address] [place], [place] [id number]  Dept: [phone]  Loc: [phone]

## 2024-03-28 NOTE — PROGRESS NOTES
Pt is requesting a cpap as he wears one at home. Kathy NP said it was ok to order. Also asked if she wanted pt's home medications to be ordered at this time as only a couple of them were ordered. Everything pt takes is in the morning and he already took these medications. She said it was ok to wait until the morning to have AM doc take a look at home meds    Electronically signed by Shirin Weiss RN on 3/27/2024 at 9:46 PM

## 2024-03-28 NOTE — CARE COORDINATION
Social Work discharge planning  SW spoke to pt, who said he lives with his partner Jovanna in 1 story. Pt said he is independent and drives. He said she saw his PCP DR Venegas in the past 3 months. He uses [facility] on [address] in [place]. Pt denied need for hhc or dme. He is on room air. Needs for transition to home not identified presently.  Electronically signed by CHARANJIT Carter on 3/28/2024 at 10:30 AM

## 2024-03-28 NOTE — PROGRESS NOTES
[facility] Quality Flow/Interdisciplinary Rounds Progress Note        Quality Flow Rounds held on March 28, 2024    Disciplines Attending:  Bedside Nurse, , , and Nursing Unit Leadership    Pa Lugo was admitted on 3/27/2024 10:43 AM    Anticipated Discharge Date:       Disposition:    Ganesh Score:  Ganesh Scale Score: 22    Readmission Risk              Risk of Unplanned Readmission:  10           Discussed patient goal for the day, patient clinical progression, and barriers to discharge.  The following Goal(s) of the Day/Commitment(s) have been identified:  Diagnostics - Report Results and Labs - Report Results      Mel Zhou RN  March 28, 2024

## 2024-03-28 NOTE — PROGRESS NOTES
[facility] Hospitalist Progress Note    Admitting Date and Time: 3/27/2024 10:43 AM  Admit Dx: COVID-19 virus infection [U07.1]    Subjective:  Patient is being followed for COVID-19 virus infection [U07.1]   Pt was seen and examined today. Denies any new issues    ROS: denies fever, chills, cp, sob, n/v, HA unless stated above.     enoxaparin  130 mg SubCUTAneous BID    lisinopril  5 mg Oral QAM    atorvastatin  20 mg Oral QAM    insulin lispro  0-4 Units SubCUTAneous TID WC    insulin lispro  0-4 Units SubCUTAneous Nightly    warfarin placeholder: dosing by pharmacy   Other RX Placeholder    warfarin  10 mg Oral Once    sodium chloride flush  5-40 mL IntraVENous 2 times per day     glucose, 4 tablet, PRN  dextrose bolus, 125 mL, PRN   Or  dextrose bolus, 250 mL, PRN  glucagon (rDNA), 1 mg, PRN  dextrose, , Continuous PRN  valACYclovir, 1,000 mg, Daily PRN  sodium chloride flush, 5-40 mL, PRN  sodium chloride, , PRN  ondansetron, 4 mg, Q8H PRN   Or  ondansetron, 4 mg, Q6H PRN  polyethylene glycol, 17 g, Daily PRN  acetaminophen, 650 mg, Q6H PRN   Or  acetaminophen, 650 mg, Q6H PRN  HYDROcodone 5 mg - acetaminophen, 1 tablet, Q6H PRN         Objective:    /65   Pulse 76   Temp 99.3 °F (37.4 °C) (Oral)   Resp 16   Ht 2.108 m (6' 11\")   Wt 132.4 kg (291 lb 12.8 oz)   SpO2 94%   BMI 29.78 kg/m²     General Appearance: alert and oriented to person, place and time and in no acute distress  Skin: warm and dry  Head: normocephalic and atraumatic  Pulmonary/Chest: clear to auscultation bilaterally- no wheezes, rales or rhonchi, normal air movement, no respiratory distress  Cardiovascular: normal rate, normal S1 and S2  Abdomen: soft, non-tender, non-distended, normal bowel sounds  Extremities: BLE stasis dermatitis skin changes, RLE edema noted      Recent Labs     03/27/24  1137 03/28/24  0656    134   K 4.2 4.0    103   CO2 25 21*   BUN 13 15   CREATININE 0.9 0.8   GLUCOSE 125* 136*

## 2024-03-28 NOTE — PROGRESS NOTES
Patient has support from his Spouse and Children. Patient received prayer and prayer card. Continued spiritual support.

## 2024-03-29 DIAGNOSIS — I82.4Y1 ACUTE DEEP VEIN THROMBOSIS (DVT) OF PROXIMAL VEIN OF RIGHT LOWER EXTREMITY (HCC): Primary | ICD-10-CM

## 2024-03-29 LAB
ALBUMIN SERPL-MCNC: 4.2 G/DL (ref 3.5–5.2)
ALP SERPL-CCNC: 90 U/L (ref 40–129)
ALT SERPL-CCNC: 27 U/L (ref 0–40)
ANION GAP SERPL CALCULATED.3IONS-SCNC: 10 MMOL/L (ref 7–16)
AST SERPL-CCNC: 26 U/L (ref 0–39)
AT III ACT/NOR PPP CHRO: 102 % (ref 83–121)
BASOPHILS # BLD: 0.01 K/UL (ref 0–0.2)
BASOPHILS NFR BLD: 0 % (ref 0–2)
BILIRUB SERPL-MCNC: 0.3 MG/DL (ref 0–1.2)
BUN SERPL-MCNC: 23 MG/DL (ref 6–20)
CALCIUM SERPL-MCNC: 8.8 MG/DL (ref 8.6–10.2)
CHLORIDE SERPL-SCNC: 102 MMOL/L (ref 98–107)
CO2 SERPL-SCNC: 22 MMOL/L (ref 22–29)
CREAT SERPL-MCNC: 0.7 MG/DL (ref 0.7–1.2)
EOSINOPHIL # BLD: 0.03 K/UL (ref 0.05–0.5)
EOSINOPHILS RELATIVE PERCENT: 1 % (ref 0–6)
ERYTHROCYTE [DISTWIDTH] IN BLOOD BY AUTOMATED COUNT: 14.4 % (ref 11.5–15)
GFR SERPL CREATININE-BSD FRML MDRD: >90 ML/MIN/1.73M2
GLUCOSE BLD-MCNC: 127 MG/DL (ref 74–99)
GLUCOSE BLD-MCNC: 128 MG/DL (ref 74–99)
GLUCOSE BLD-MCNC: 157 MG/DL (ref 74–99)
GLUCOSE BLD-MCNC: 168 MG/DL (ref 74–99)
GLUCOSE SERPL-MCNC: 160 MG/DL (ref 74–99)
HCT VFR BLD AUTO: 46.1 % (ref 37–54)
HGB BLD-MCNC: 15.4 G/DL (ref 12.5–16.5)
IMM GRANULOCYTES # BLD AUTO: <0.03 K/UL (ref 0–0.58)
IMM GRANULOCYTES NFR BLD: 0 % (ref 0–5)
INR PPP: 1
LYMPHOCYTES NFR BLD: 1.87 K/UL (ref 1.5–4)
LYMPHOCYTES RELATIVE PERCENT: 49 % (ref 20–42)
MCH RBC QN AUTO: 31.9 PG (ref 26–35)
MCHC RBC AUTO-ENTMCNC: 33.4 G/DL (ref 32–34.5)
MCV RBC AUTO: 95.4 FL (ref 80–99.9)
MONOCYTES NFR BLD: 0.33 K/UL (ref 0.1–0.95)
MONOCYTES NFR BLD: 9 % (ref 2–12)
NEUTROPHILS NFR BLD: 41 % (ref 43–80)
NEUTS SEG NFR BLD: 1.57 K/UL (ref 1.8–7.3)
PLATELET # BLD AUTO: 110 K/UL (ref 130–450)
PMV BLD AUTO: 9.9 FL (ref 7–12)
POTASSIUM SERPL-SCNC: 4.4 MMOL/L (ref 3.5–5)
PROT SERPL-MCNC: 6.5 G/DL (ref 6.4–8.3)
PROTHROMBIN TIME: 11.4 SEC (ref 9.3–12.4)
RBC # BLD AUTO: 4.83 M/UL (ref 3.8–5.8)
SODIUM SERPL-SCNC: 134 MMOL/L (ref 132–146)
WBC OTHER # BLD: 3.8 K/UL (ref 4.5–11.5)

## 2024-03-29 PROCEDURE — 99232 SBSQ HOSP IP/OBS MODERATE 35: CPT | Performed by: STUDENT IN AN ORGANIZED HEALTH CARE EDUCATION/TRAINING PROGRAM

## 2024-03-29 PROCEDURE — 85610 PROTHROMBIN TIME: CPT

## 2024-03-29 PROCEDURE — 80053 COMPREHEN METABOLIC PANEL: CPT

## 2024-03-29 PROCEDURE — 99232 SBSQ HOSP IP/OBS MODERATE 35: CPT | Performed by: CLINICAL NURSE SPECIALIST

## 2024-03-29 PROCEDURE — 99222 1ST HOSP IP/OBS MODERATE 55: CPT | Performed by: STUDENT IN AN ORGANIZED HEALTH CARE EDUCATION/TRAINING PROGRAM

## 2024-03-29 PROCEDURE — 82962 GLUCOSE BLOOD TEST: CPT

## 2024-03-29 PROCEDURE — 2580000003 HC RX 258: Performed by: INTERNAL MEDICINE

## 2024-03-29 PROCEDURE — 6360000002 HC RX W HCPCS: Performed by: STUDENT IN AN ORGANIZED HEALTH CARE EDUCATION/TRAINING PROGRAM

## 2024-03-29 PROCEDURE — 1200000000 HC SEMI PRIVATE

## 2024-03-29 PROCEDURE — 6370000000 HC RX 637 (ALT 250 FOR IP): Performed by: INTERNAL MEDICINE

## 2024-03-29 PROCEDURE — 85025 COMPLETE CBC W/AUTO DIFF WBC: CPT

## 2024-03-29 PROCEDURE — 6370000000 HC RX 637 (ALT 250 FOR IP): Performed by: CLINICAL NURSE SPECIALIST

## 2024-03-29 PROCEDURE — 6370000000 HC RX 637 (ALT 250 FOR IP): Performed by: STUDENT IN AN ORGANIZED HEALTH CARE EDUCATION/TRAINING PROGRAM

## 2024-03-29 RX ORDER — WARFARIN SODIUM 5 MG/1
10 TABLET ORAL
Status: COMPLETED | OUTPATIENT
Start: 2024-03-29 | End: 2024-03-29

## 2024-03-29 RX ADMIN — HYDROCODONE BITARTRATE AND ACETAMINOPHEN 1 TABLET: 5; 325 TABLET ORAL at 18:32

## 2024-03-29 RX ADMIN — LISINOPRIL 5 MG: 5 TABLET ORAL at 09:09

## 2024-03-29 RX ADMIN — ENOXAPARIN SODIUM 130 MG: 150 INJECTION SUBCUTANEOUS at 20:47

## 2024-03-29 RX ADMIN — HYDROCODONE BITARTRATE AND ACETAMINOPHEN 1 TABLET: 5; 325 TABLET ORAL at 12:32

## 2024-03-29 RX ADMIN — SODIUM CHLORIDE, PRESERVATIVE FREE 10 ML: 5 INJECTION INTRAVENOUS at 09:14

## 2024-03-29 RX ADMIN — ATORVASTATIN CALCIUM 20 MG: 20 TABLET, FILM COATED ORAL at 09:09

## 2024-03-29 RX ADMIN — HYDROCODONE BITARTRATE AND ACETAMINOPHEN 1 TABLET: 5; 325 TABLET ORAL at 00:06

## 2024-03-29 RX ADMIN — SODIUM CHLORIDE, PRESERVATIVE FREE 10 ML: 5 INJECTION INTRAVENOUS at 20:47

## 2024-03-29 RX ADMIN — ENOXAPARIN SODIUM 130 MG: 150 INJECTION SUBCUTANEOUS at 09:09

## 2024-03-29 RX ADMIN — WARFARIN SODIUM 10 MG: 5 TABLET ORAL at 18:32

## 2024-03-29 RX ADMIN — HYDROCODONE BITARTRATE AND ACETAMINOPHEN 1 TABLET: 5; 325 TABLET ORAL at 06:24

## 2024-03-29 ASSESSMENT — PAIN SCALES - GENERAL
PAINLEVEL_OUTOF10: 9
PAINLEVEL_OUTOF10: 2
PAINLEVEL_OUTOF10: 8
PAINLEVEL_OUTOF10: 6

## 2024-03-29 ASSESSMENT — PAIN DESCRIPTION - LOCATION
LOCATION: BACK;LEG
LOCATION: BACK;LEG

## 2024-03-29 ASSESSMENT — PAIN DESCRIPTION - ORIENTATION
ORIENTATION: RIGHT;LEFT;POSTERIOR
ORIENTATION: RIGHT;LEFT

## 2024-03-29 ASSESSMENT — PAIN DESCRIPTION - DESCRIPTORS
DESCRIPTORS: ACHING;SORE
DESCRIPTORS: ACHING;SORE

## 2024-03-29 ASSESSMENT — PAIN - FUNCTIONAL ASSESSMENT
PAIN_FUNCTIONAL_ASSESSMENT: PREVENTS OR INTERFERES SOME ACTIVE ACTIVITIES AND ADLS
PAIN_FUNCTIONAL_ASSESSMENT: PREVENTS OR INTERFERES WITH MANY ACTIVE NOT PASSIVE ACTIVITIES

## 2024-03-29 NOTE — PROGRESS NOTES
Pharmacy Note    This patient was ordered empagliflozin (Jardiance) for Type 2 diabetes. Per the Pharmacy & Therapeutics Committee, this medication is non-formulary and not stocked by pharmacy for the reason indicated below. The medication can be reordered at discharge.     Medications in which risks outweigh benefits during hospitalization:           -  oral bisphosphonates         -  raloxifene (Evista)        -  SGLT2 inhibitors (ordered in the hospital for an indication other than heart failure or chronic kidney disease)    Medications that lack necessity during an acute hospital stay:        -  nasal antihistamines        -  nasal ipratropium 0.03% and 0.06%        -  nasal miacalcin        -  acyclovir topical cream/ointment orders for herpes labialis (cold sores)

## 2024-03-29 NOTE — PROGRESS NOTES
Department of [facility] Med Oncology  Consult Follow Up Note    SUBJECTIVE:      OBJECTIVE:   Sitting on couch wife and son at bedside. He appears in NAD . Dos admit to let leg pain .     Current Medications:   Glucose (TRUEPLUS) oral gel 15 g, PRN  warfarin (COUMADIN) tablet 10 mg, Once  enoxaparin (LOVENOX) injection 130 mg, BID  lisinopril (PRINIVIL;ZESTRIL) tablet 5 mg, QAM  atorvastatin (LIPITOR) tablet 20 mg, QAM  insulin lispro (HUMALOG) injection vial 0-4 Units, TID WC  insulin lispro (HUMALOG) injection vial 0-4 Units, Nightly  dextrose bolus 10% 125 mL, PRN   Or  dextrose bolus 10% 250 mL, PRN  glucagon injection 1 mg, PRN  dextrose 10 % infusion, Continuous PRN  warfarin placeholder: dosing by pharmacy, RX Placeholder  valACYclovir (VALTREX) tablet 1,000 mg, Daily PRN  sodium chloride flush 0.9 % injection 5-40 mL, 2 times per day  sodium chloride flush 0.9 % injection 5-40 mL, PRN  0.9 % sodium chloride infusion, PRN  ondansetron (ZOFRAN-ODT) disintegrating tablet 4 mg, Q8H PRN   Or  ondansetron (ZOFRAN) injection 4 mg, Q6H PRN  polyethylene glycol (GLYCOLAX) packet 17 g, Daily PRN  acetaminophen (TYLENOL) tablet 650 mg, Q6H PRN   Or  acetaminophen (TYLENOL) suppository 650 mg, Q6H PRN  HYDROcodone-acetaminophen (NORCO) 5-325 MG per tablet 1 tablet, Q6H PRN    REVIEW OF SYSTEMS:  CONSTITUTIONAL :  No fever, chills, loss of appetite or weight.  ENMT: Eyes: no abnormal discharge, pain or visual abnormality. Nose: no coryza, pain, or epistaxis. Mouth: no pain, sores or masses. No sore throat or Postnasal drip.  RESPIRATORY:+mild shortness or breath, +cough non productive , no  hemoptysis.  CARDIOVASCULAR: No chest pain, palpitations, orthopnea or PND.  GASTROINTESTINAL: No nausea, vomiting, abdominal pain, diarrhea, constipation, melena or hematochezia.  GENITOURINARY: No dysuria, urinary frequency, hematuria, or abnormal discharge.   ENDOCRINE: No polydipsia, polyuria, cold or heat

## 2024-03-29 NOTE — PROGRESS NOTES
[facility] Hospitalist Progress Note    Admitting Date and Time: 3/27/2024 10:43 AM  Admit Dx: COVID-19 virus infection [U07.1]    Subjective:  Patient is being followed for COVID-19 virus infection [U07.1]   Pt was seen and examined today. Denies any new issues    ROS: denies fever, chills, cp, sob, n/v, HA unless stated above.     warfarin  10 mg Oral Once    enoxaparin  130 mg SubCUTAneous BID    lisinopril  5 mg Oral QAM    atorvastatin  20 mg Oral QAM    insulin lispro  0-4 Units SubCUTAneous TID WC    insulin lispro  0-4 Units SubCUTAneous Nightly    warfarin placeholder: dosing by pharmacy   Other RX Placeholder    sodium chloride flush  5-40 mL IntraVENous 2 times per day     Glucose, 15 g, PRN  dextrose bolus, 125 mL, PRN   Or  dextrose bolus, 250 mL, PRN  glucagon (rDNA), 1 mg, PRN  dextrose, , Continuous PRN  valACYclovir, 1,000 mg, Daily PRN  sodium chloride flush, 5-40 mL, PRN  sodium chloride, , PRN  ondansetron, 4 mg, Q8H PRN   Or  ondansetron, 4 mg, Q6H PRN  polyethylene glycol, 17 g, Daily PRN  acetaminophen, 650 mg, Q6H PRN   Or  acetaminophen, 650 mg, Q6H PRN  HYDROcodone 5 mg - acetaminophen, 1 tablet, Q6H PRN         Objective:    /71   Pulse 71   Temp 98.6 °F (37 °C) (Oral)   Resp 18   Ht 2.108 m (6' 11\")   Wt 132.4 kg (291 lb 12.8 oz)   SpO2 94%   BMI 29.78 kg/m²     General Appearance: alert and oriented to person, place and time and in no acute distress  Skin: warm and dry  Head: normocephalic and atraumatic  Pulmonary/Chest: clear to auscultation bilaterally- no wheezes, rales or rhonchi, normal air movement, no respiratory distress  Cardiovascular: normal rate, normal S1 and S2  Abdomen: soft, non-tender, non-distended, normal bowel sounds  Extremities: BLE stasis dermatitis skin changes, BLE edema noted, +4 on R and +2 on L      Recent Labs     03/27/24  1137 03/28/24  0656 03/29/24  0410    134 134   K 4.2 4.0 4.4    103 102   CO2 25 21* 22   BUN 13 15

## 2024-03-29 NOTE — PROGRESS NOTES
Pharmacy Consultation Note  (Warfarin Dosing and Monitoring)    Initial consult date: 3/28  Consulting Provider: Marisela Silvestre MOLLY Lugo is a 58 y.o. male for whom pharmacy has been asked to manage warfarin therapy.     Weight:   Wt Readings from Last 1 Encounters:   03/28/24 132.4 kg (291 lb 12.8 oz)       TSH:  No results found for: \"TSH\"    Hepatic Function Panel:                            Lab Results   Component Value Date/Time    ALKPHOS 90 03/29/2024 04:10 AM    ALT 27 03/29/2024 04:10 AM    AST 26 03/29/2024 04:10 AM    PROT 6.5 03/29/2024 04:10 AM    BILITOT 0.3 03/29/2024 04:10 AM    BILIDIR 0.2 07/13/2011 09:25 PM    LABALBU 4.2 03/29/2024 04:10 AM    LABALBU 4.3 09/21/2011 06:00 AM           Recent Labs     03/27/24  1137 03/28/24  0656 03/29/24  0410   HGB 15.4 14.5 15.4   PLT  --  101* 110*       Date Warfarin Dose INR Heparin or LMWH Comment   3/28 10 mg 1.0 Enoxaparin 130 mg subQ twice daily    3/29 10 mg 1.0 Enoxaparin 130 mg subQ twice daily                           Assessment:  Patient is a 58 y.o. male on warfarin for New Venous Thromboembolism.  Patient was previously on apixaban, but will now is on therapeutic enoxaparin to be bridged to warfarin.  Goal INR 2 - 3  INR 1.0 today    Plan:  Warfarin 10 mg again tonight  Daily PT/INR until the INR is stable within the therapeutic range  Pharmacist will follow and monitor/adjust dosing as necessary      Rakan Serna RPH 3/29/2024 9:55 AM    JOSHUA: [phone]  SEY: [phone]  SJW: [phone]

## 2024-03-29 NOTE — PATIENT CARE CONFERENCE
[facility] Quality Flow/Interdisciplinary Rounds Progress Note        Quality Flow Rounds held on March 29, 2024    Disciplines Attending:  Bedside Nurse, , , and Nursing Unit Leadership    Pa Lugo was admitted on 3/27/2024 10:43 AM    Anticipated Discharge Date:       Disposition:    Ganesh Score:  Ganesh Scale Score: 22    Readmission Risk              Risk of Unplanned Readmission:  11           Discussed patient goal for the day, patient clinical progression, and barriers to discharge.  The following Goal(s) of the Day/Commitment(s) have been identified:  Labs - Report Results      Alanna Egan RN  March 29, 2024

## 2024-03-30 LAB
ALBUMIN SERPL-MCNC: 3.7 G/DL (ref 3.5–5.2)
ALP SERPL-CCNC: 76 U/L (ref 40–129)
ALT SERPL-CCNC: 22 U/L (ref 0–40)
ANION GAP SERPL CALCULATED.3IONS-SCNC: 8 MMOL/L (ref 7–16)
AST SERPL-CCNC: 23 U/L (ref 0–39)
BASOPHILS # BLD: 0.01 K/UL (ref 0–0.2)
BASOPHILS NFR BLD: 0 % (ref 0–2)
BILIRUB SERPL-MCNC: 0.3 MG/DL (ref 0–1.2)
BUN SERPL-MCNC: 16 MG/DL (ref 6–20)
CALCIUM SERPL-MCNC: 8.5 MG/DL (ref 8.6–10.2)
CHLORIDE SERPL-SCNC: 101 MMOL/L (ref 98–107)
CO2 SERPL-SCNC: 24 MMOL/L (ref 22–29)
CREAT SERPL-MCNC: 0.7 MG/DL (ref 0.7–1.2)
EOSINOPHIL # BLD: 0.07 K/UL (ref 0.05–0.5)
EOSINOPHILS RELATIVE PERCENT: 2 % (ref 0–6)
ERYTHROCYTE [DISTWIDTH] IN BLOOD BY AUTOMATED COUNT: 14.2 % (ref 11.5–15)
GFR SERPL CREATININE-BSD FRML MDRD: >90 ML/MIN/1.73M2
GLUCOSE BLD-MCNC: 134 MG/DL (ref 74–99)
GLUCOSE BLD-MCNC: 134 MG/DL (ref 74–99)
GLUCOSE BLD-MCNC: 158 MG/DL (ref 74–99)
GLUCOSE BLD-MCNC: 167 MG/DL (ref 74–99)
GLUCOSE SERPL-MCNC: 163 MG/DL (ref 74–99)
HCT VFR BLD AUTO: 43.9 % (ref 37–54)
HGB BLD-MCNC: 14.6 G/DL (ref 12.5–16.5)
IMM GRANULOCYTES # BLD AUTO: <0.03 K/UL (ref 0–0.58)
IMM GRANULOCYTES NFR BLD: 0 % (ref 0–5)
INR PPP: 1.1
LYMPHOCYTES NFR BLD: 2.14 K/UL (ref 1.5–4)
LYMPHOCYTES RELATIVE PERCENT: 57 % (ref 20–42)
MCH RBC QN AUTO: 31.2 PG (ref 26–35)
MCHC RBC AUTO-ENTMCNC: 33.3 G/DL (ref 32–34.5)
MCV RBC AUTO: 93.8 FL (ref 80–99.9)
MONOCYTES NFR BLD: 0.34 K/UL (ref 0.1–0.95)
MONOCYTES NFR BLD: 9 % (ref 2–12)
NEUTROPHILS NFR BLD: 32 % (ref 43–80)
NEUTS SEG NFR BLD: 1.22 K/UL (ref 1.8–7.3)
PLATELET # BLD AUTO: 101 K/UL (ref 130–450)
PMV BLD AUTO: 10 FL (ref 7–12)
POTASSIUM SERPL-SCNC: 4.5 MMOL/L (ref 3.5–5)
PROT SERPL-MCNC: 5.9 G/DL (ref 6.4–8.3)
PROTHROMBIN TIME: 12.6 SEC (ref 9.3–12.4)
RBC # BLD AUTO: 4.68 M/UL (ref 3.8–5.8)
SODIUM SERPL-SCNC: 133 MMOL/L (ref 132–146)
WBC OTHER # BLD: 3.8 K/UL (ref 4.5–11.5)

## 2024-03-30 PROCEDURE — 6360000002 HC RX W HCPCS: Performed by: STUDENT IN AN ORGANIZED HEALTH CARE EDUCATION/TRAINING PROGRAM

## 2024-03-30 PROCEDURE — 2580000003 HC RX 258: Performed by: INTERNAL MEDICINE

## 2024-03-30 PROCEDURE — 85025 COMPLETE CBC W/AUTO DIFF WBC: CPT

## 2024-03-30 PROCEDURE — 6370000000 HC RX 637 (ALT 250 FOR IP): Performed by: INTERNAL MEDICINE

## 2024-03-30 PROCEDURE — 80053 COMPREHEN METABOLIC PANEL: CPT

## 2024-03-30 PROCEDURE — 85610 PROTHROMBIN TIME: CPT

## 2024-03-30 PROCEDURE — 82962 GLUCOSE BLOOD TEST: CPT

## 2024-03-30 PROCEDURE — 86146 BETA-2 GLYCOPROTEIN ANTIBODY: CPT

## 2024-03-30 PROCEDURE — 1200000000 HC SEMI PRIVATE

## 2024-03-30 PROCEDURE — 99232 SBSQ HOSP IP/OBS MODERATE 35: CPT | Performed by: STUDENT IN AN ORGANIZED HEALTH CARE EDUCATION/TRAINING PROGRAM

## 2024-03-30 PROCEDURE — 6370000000 HC RX 637 (ALT 250 FOR IP): Performed by: STUDENT IN AN ORGANIZED HEALTH CARE EDUCATION/TRAINING PROGRAM

## 2024-03-30 PROCEDURE — 6370000000 HC RX 637 (ALT 250 FOR IP): Performed by: CLINICAL NURSE SPECIALIST

## 2024-03-30 RX ORDER — WARFARIN SODIUM 5 MG/1
10 TABLET ORAL
Status: COMPLETED | OUTPATIENT
Start: 2024-03-30 | End: 2024-03-30

## 2024-03-30 RX ADMIN — ENOXAPARIN SODIUM 130 MG: 150 INJECTION SUBCUTANEOUS at 20:12

## 2024-03-30 RX ADMIN — ENOXAPARIN SODIUM 130 MG: 150 INJECTION SUBCUTANEOUS at 08:37

## 2024-03-30 RX ADMIN — SODIUM CHLORIDE, PRESERVATIVE FREE 10 ML: 5 INJECTION INTRAVENOUS at 08:39

## 2024-03-30 RX ADMIN — WARFARIN SODIUM 10 MG: 5 TABLET ORAL at 18:08

## 2024-03-30 RX ADMIN — ATORVASTATIN CALCIUM 20 MG: 20 TABLET, FILM COATED ORAL at 08:38

## 2024-03-30 RX ADMIN — LISINOPRIL 5 MG: 5 TABLET ORAL at 08:38

## 2024-03-30 RX ADMIN — SODIUM CHLORIDE, PRESERVATIVE FREE 10 ML: 5 INJECTION INTRAVENOUS at 20:23

## 2024-03-30 RX ADMIN — HYDROCODONE BITARTRATE AND ACETAMINOPHEN 1 TABLET: 5; 325 TABLET ORAL at 20:19

## 2024-03-30 RX ADMIN — HYDROCODONE BITARTRATE AND ACETAMINOPHEN 1 TABLET: 5; 325 TABLET ORAL at 06:47

## 2024-03-30 RX ADMIN — HYDROCODONE BITARTRATE AND ACETAMINOPHEN 1 TABLET: 5; 325 TABLET ORAL at 00:25

## 2024-03-30 ASSESSMENT — PAIN - FUNCTIONAL ASSESSMENT
PAIN_FUNCTIONAL_ASSESSMENT: PREVENTS OR INTERFERES SOME ACTIVE ACTIVITIES AND ADLS
PAIN_FUNCTIONAL_ASSESSMENT: ACTIVITIES ARE NOT PREVENTED
PAIN_FUNCTIONAL_ASSESSMENT: ACTIVITIES ARE NOT PREVENTED
PAIN_FUNCTIONAL_ASSESSMENT: PREVENTS OR INTERFERES SOME ACTIVE ACTIVITIES AND ADLS

## 2024-03-30 ASSESSMENT — PAIN SCALES - GENERAL
PAINLEVEL_OUTOF10: 5
PAINLEVEL_OUTOF10: 6
PAINLEVEL_OUTOF10: 0
PAINLEVEL_OUTOF10: 2
PAINLEVEL_OUTOF10: 2
PAINLEVEL_OUTOF10: 7
PAINLEVEL_OUTOF10: 4

## 2024-03-30 ASSESSMENT — PAIN DESCRIPTION - PAIN TYPE
TYPE: ACUTE PAIN
TYPE: ACUTE PAIN

## 2024-03-30 ASSESSMENT — PAIN DESCRIPTION - ORIENTATION
ORIENTATION: RIGHT
ORIENTATION: RIGHT;LEFT

## 2024-03-30 ASSESSMENT — PAIN DESCRIPTION - LOCATION
LOCATION: BACK;LEG;KNEE
LOCATION: BACK;LEG;KNEE
LOCATION: KNEE
LOCATION: BACK;LEG;KNEE

## 2024-03-30 ASSESSMENT — PAIN DESCRIPTION - FREQUENCY
FREQUENCY: CONTINUOUS
FREQUENCY: CONTINUOUS

## 2024-03-30 ASSESSMENT — PAIN DESCRIPTION - DESCRIPTORS
DESCRIPTORS: ACHING;SORE;SHARP
DESCRIPTORS: ACHING;SORE
DESCRIPTORS: SHARP;POUNDING

## 2024-03-30 ASSESSMENT — PAIN DESCRIPTION - ONSET
ONSET: ON-GOING
ONSET: ON-GOING

## 2024-03-30 NOTE — PROGRESS NOTES
[facility] Hospitalist Progress Note    Admitting Date and Time: 3/27/2024 10:43 AM  Admit Dx: COVID-19 virus infection [U07.1]    Subjective:  Patient is being followed for COVID-19 virus infection [U07.1]   Pt was seen and examined today. Denies any new issues    ROS: denies fever, chills, cp, sob, n/v, HA unless stated above.     warfarin  10 mg Oral Once    enoxaparin  130 mg SubCUTAneous BID    lisinopril  5 mg Oral QAM    atorvastatin  20 mg Oral QAM    insulin lispro  0-4 Units SubCUTAneous TID WC    insulin lispro  0-4 Units SubCUTAneous Nightly    warfarin placeholder: dosing by pharmacy   Other RX Placeholder    sodium chloride flush  5-40 mL IntraVENous 2 times per day     Glucose, 15 g, PRN  dextrose bolus, 125 mL, PRN   Or  dextrose bolus, 250 mL, PRN  glucagon (rDNA), 1 mg, PRN  dextrose, , Continuous PRN  valACYclovir, 1,000 mg, Daily PRN  sodium chloride flush, 5-40 mL, PRN  sodium chloride, , PRN  ondansetron, 4 mg, Q8H PRN   Or  ondansetron, 4 mg, Q6H PRN  polyethylene glycol, 17 g, Daily PRN  acetaminophen, 650 mg, Q6H PRN   Or  acetaminophen, 650 mg, Q6H PRN  HYDROcodone 5 mg - acetaminophen, 1 tablet, Q6H PRN         Objective:    BP (!) 104/59   Pulse 68   Temp 98.2 °F (36.8 °C) (Oral)   Resp 16   Ht 2.108 m (6' 11\")   Wt 132.2 kg (291 lb 7.2 oz)   SpO2 91%   BMI 29.74 kg/m²     General Appearance: alert and oriented to person, place and time and in no acute distress  Skin: warm and dry  Head: normocephalic and atraumatic  Pulmonary/Chest: clear to auscultation bilaterally- no wheezes, rales or rhonchi, normal air movement, no respiratory distress  Cardiovascular: normal rate, normal S1 and S2  Abdomen: soft, non-tender, non-distended, normal bowel sounds  Extremities: BLE stasis dermatitis skin changes, BLE edema noted, +3 on R and +2 on L      Recent Labs     03/28/24  0656 03/29/24  0410 03/30/24  0338    134 133   K 4.0 4.4 4.5    102 101   CO2 21* 22 24   BUN 15

## 2024-03-30 NOTE — PLAN OF CARE
Problem: Pain  Goal: Verbalizes/displays adequate comfort level or baseline comfort level  Outcome: Progressing     Problem: Safety - Adult  Goal: Free from fall injury  Outcome: Progressing     Problem: ABCDS Injury Assessment  Goal: Absence of physical injury  Outcome: Progressing     Problem: Discharge Planning  Goal: Discharge to home or other facility with appropriate resources  Outcome: Progressing     Problem: Chronic Conditions and Co-morbidities  Goal: Patient's chronic conditions and co-morbidity symptoms are monitored and maintained or improved  Outcome: Progressing

## 2024-03-30 NOTE — PROGRESS NOTES
Pharmacy Consultation Note  (Warfarin Dosing and Monitoring)    Initial consult date: 3/28  Consulting Provider: Marisela Silvestre MOLLY Lugo is a 58 y.o. male for whom pharmacy has been asked to manage warfarin therapy.     Weight:   Wt Readings from Last 1 Encounters:   03/30/24 132.2 kg (291 lb 7.2 oz)       TSH:  No results found for: \"TSH\"    Hepatic Function Panel:                            Lab Results   Component Value Date/Time    ALKPHOS 76 03/30/2024 03:38 AM    ALT 22 03/30/2024 03:38 AM    AST 23 03/30/2024 03:38 AM    PROT 5.9 03/30/2024 03:38 AM    BILITOT 0.3 03/30/2024 03:38 AM    BILIDIR 0.2 07/13/2011 09:25 PM    LABALBU 3.7 03/30/2024 03:38 AM    LABALBU 4.3 09/21/2011 06:00 AM           Recent Labs     03/28/24  0656 03/29/24  0410 03/30/24  0338   HGB 14.5 15.4 14.6   * 110* 101*       Date Warfarin Dose INR Heparin or LMWH Comment   3/28 10 mg 1.0 Enoxaparin 130 mg subQ twice daily    3/29 10 mg 1.0 Enoxaparin 130 mg subQ twice daily    3/30 10 mg 1.1 Enoxaparin 130 mg subQ twice daily                                  Assessment:  Patient is a 58 y.o. male on warfarin for New Venous Thromboembolism.  Patient was previously on apixaban, but will now is on therapeutic enoxaparin to be bridged to warfarin.  Goal INR 2 - 3  INR 1.1 today    Plan:  Warfarin 10 mg again tonight  Daily PT/INR until the INR is stable within the therapeutic range  Pharmacist will follow and monitor/adjust dosing as necessary      Rakan Serna RPH 3/30/2024 10:02 AM    SEB: [phone]  SEY: [phone]  SJW: [phone]

## 2024-03-31 LAB
ALBUMIN SERPL-MCNC: 3.9 G/DL (ref 3.5–5.2)
ALP SERPL-CCNC: 81 U/L (ref 40–129)
ALT SERPL-CCNC: 21 U/L (ref 0–40)
ANION GAP SERPL CALCULATED.3IONS-SCNC: 8 MMOL/L (ref 7–16)
AST SERPL-CCNC: 20 U/L (ref 0–39)
BASOPHILS # BLD: 0.02 K/UL (ref 0–0.2)
BASOPHILS NFR BLD: 1 % (ref 0–2)
BILIRUB SERPL-MCNC: 0.3 MG/DL (ref 0–1.2)
BUN SERPL-MCNC: 13 MG/DL (ref 6–20)
CALCIUM SERPL-MCNC: 8.8 MG/DL (ref 8.6–10.2)
CHLORIDE SERPL-SCNC: 103 MMOL/L (ref 98–107)
CO2 SERPL-SCNC: 27 MMOL/L (ref 22–29)
CREAT SERPL-MCNC: 0.7 MG/DL (ref 0.7–1.2)
EOSINOPHIL # BLD: 0.11 K/UL (ref 0.05–0.5)
EOSINOPHILS RELATIVE PERCENT: 3 % (ref 0–6)
ERYTHROCYTE [DISTWIDTH] IN BLOOD BY AUTOMATED COUNT: 14 % (ref 11.5–15)
GFR SERPL CREATININE-BSD FRML MDRD: >90 ML/MIN/1.73M2
GLUCOSE BLD-MCNC: 143 MG/DL (ref 74–99)
GLUCOSE BLD-MCNC: 149 MG/DL (ref 74–99)
GLUCOSE BLD-MCNC: 164 MG/DL (ref 74–99)
GLUCOSE BLD-MCNC: 244 MG/DL (ref 74–99)
GLUCOSE SERPL-MCNC: 174 MG/DL (ref 74–99)
HCT VFR BLD AUTO: 45.7 % (ref 37–54)
HGB BLD-MCNC: 15 G/DL (ref 12.5–16.5)
IMM GRANULOCYTES # BLD AUTO: <0.03 K/UL (ref 0–0.58)
IMM GRANULOCYTES NFR BLD: 0 % (ref 0–5)
INR PPP: 1.2
LYMPHOCYTES NFR BLD: 2.18 K/UL (ref 1.5–4)
LYMPHOCYTES RELATIVE PERCENT: 53 % (ref 20–42)
MCH RBC QN AUTO: 31.8 PG (ref 26–35)
MCHC RBC AUTO-ENTMCNC: 32.8 G/DL (ref 32–34.5)
MCV RBC AUTO: 97 FL (ref 80–99.9)
MONOCYTES NFR BLD: 0.35 K/UL (ref 0.1–0.95)
MONOCYTES NFR BLD: 9 % (ref 2–12)
NEUTROPHILS NFR BLD: 35 % (ref 43–80)
NEUTS SEG NFR BLD: 1.47 K/UL (ref 1.8–7.3)
PLATELET, FLUORESCENCE: 103 K/UL (ref 130–450)
PMV BLD AUTO: 9.8 FL (ref 7–12)
POTASSIUM SERPL-SCNC: 4.3 MMOL/L (ref 3.5–5)
PROT C AG ACT/NOR PPP IA: 93 % (ref 63–153)
PROT S FREE AG ACT/NOR PPP IA: 74 % (ref 74–147)
PROT SERPL-MCNC: 6 G/DL (ref 6.4–8.3)
PROTHROMBIN TIME: 13.5 SEC (ref 9.3–12.4)
RBC # BLD AUTO: 4.71 M/UL (ref 3.8–5.8)
SODIUM SERPL-SCNC: 138 MMOL/L (ref 132–146)
WBC OTHER # BLD: 4.1 K/UL (ref 4.5–11.5)

## 2024-03-31 PROCEDURE — 80053 COMPREHEN METABOLIC PANEL: CPT

## 2024-03-31 PROCEDURE — 85025 COMPLETE CBC W/AUTO DIFF WBC: CPT

## 2024-03-31 PROCEDURE — 82962 GLUCOSE BLOOD TEST: CPT

## 2024-03-31 PROCEDURE — 6370000000 HC RX 637 (ALT 250 FOR IP): Performed by: STUDENT IN AN ORGANIZED HEALTH CARE EDUCATION/TRAINING PROGRAM

## 2024-03-31 PROCEDURE — 2580000003 HC RX 258: Performed by: INTERNAL MEDICINE

## 2024-03-31 PROCEDURE — 1200000000 HC SEMI PRIVATE

## 2024-03-31 PROCEDURE — 99232 SBSQ HOSP IP/OBS MODERATE 35: CPT | Performed by: STUDENT IN AN ORGANIZED HEALTH CARE EDUCATION/TRAINING PROGRAM

## 2024-03-31 PROCEDURE — 6360000002 HC RX W HCPCS: Performed by: STUDENT IN AN ORGANIZED HEALTH CARE EDUCATION/TRAINING PROGRAM

## 2024-03-31 PROCEDURE — 85610 PROTHROMBIN TIME: CPT

## 2024-03-31 PROCEDURE — 6370000000 HC RX 637 (ALT 250 FOR IP): Performed by: CLINICAL NURSE SPECIALIST

## 2024-03-31 RX ORDER — WARFARIN SODIUM 5 MG/1
10 TABLET ORAL
Status: COMPLETED | OUTPATIENT
Start: 2024-03-31 | End: 2024-03-31

## 2024-03-31 RX ORDER — GUAIFENESIN 400 MG/1
400 TABLET ORAL 2 TIMES DAILY
Status: DISCONTINUED | OUTPATIENT
Start: 2024-03-31 | End: 2024-04-02 | Stop reason: HOSPADM

## 2024-03-31 RX ADMIN — GUAIFENESIN 400 MG: 400 TABLET ORAL at 14:10

## 2024-03-31 RX ADMIN — PETROLATUM: 420 OINTMENT TOPICAL at 14:10

## 2024-03-31 RX ADMIN — LISINOPRIL 5 MG: 5 TABLET ORAL at 09:14

## 2024-03-31 RX ADMIN — SODIUM CHLORIDE, PRESERVATIVE FREE 10 ML: 5 INJECTION INTRAVENOUS at 20:46

## 2024-03-31 RX ADMIN — ENOXAPARIN SODIUM 130 MG: 150 INJECTION SUBCUTANEOUS at 09:15

## 2024-03-31 RX ADMIN — ATORVASTATIN CALCIUM 20 MG: 20 TABLET, FILM COATED ORAL at 09:15

## 2024-03-31 RX ADMIN — SODIUM CHLORIDE, PRESERVATIVE FREE 10 ML: 5 INJECTION INTRAVENOUS at 09:15

## 2024-03-31 RX ADMIN — GUAIFENESIN 400 MG: 400 TABLET ORAL at 20:43

## 2024-03-31 RX ADMIN — WARFARIN SODIUM 10 MG: 5 TABLET ORAL at 17:48

## 2024-03-31 RX ADMIN — ENOXAPARIN SODIUM 130 MG: 150 INJECTION SUBCUTANEOUS at 20:43

## 2024-03-31 NOTE — PROGRESS NOTES
Pharmacy Consultation Note  (Warfarin Dosing and Monitoring)    Initial consult date: 3/28  Consulting Provider: Marisela Silvestre MOLLY Lugo is a 58 y.o. male for whom pharmacy has been asked to manage warfarin therapy.     Weight:   Wt Readings from Last 1 Encounters:   03/31/24 132.9 kg (293 lb)       TSH:  No results found for: \"TSH\"    Hepatic Function Panel:                            Lab Results   Component Value Date/Time    ALKPHOS 81 03/31/2024 02:30 AM    ALT 21 03/31/2024 02:30 AM    AST 20 03/31/2024 02:30 AM    PROT 6.0 03/31/2024 02:30 AM    BILITOT 0.3 03/31/2024 02:30 AM    BILIDIR 0.2 07/13/2011 09:25 PM    LABALBU 3.9 03/31/2024 02:30 AM    LABALBU 4.3 09/21/2011 06:00 AM           Recent Labs     03/29/24  0410 03/30/24  0338 03/31/24  0230   HGB 15.4 14.6 15.0   * 101*  --        Date Warfarin Dose INR Heparin or LMWH Comment   3/28 10 mg 1.0 Enoxaparin 130 mg subQ twice daily    3/29 10 mg 1.0 Enoxaparin 130 mg subQ twice daily    3/30 10 mg 1.1 Enoxaparin 130 mg subQ twice daily    3/31 10 mg 1.2 Enoxaparin 130 mg subQ twice daily                           Assessment:  Patient is a 58 y.o. male on warfarin for New Venous Thromboembolism.  Patient was previously on apixaban, but will now is on therapeutic enoxaparin to be bridged to warfarin.  Goal INR 2 - 3  INR 1.2 today    Plan:  Warfarin 10 mg again tonight  Daily PT/INR until the INR is stable within the therapeutic range  Pharmacist will follow and monitor/adjust dosing as necessary      Rakan Serna RPH 3/31/2024 9:11 AM    SEB: [phone]  SEY: [phone]  SJW: [phone]

## 2024-03-31 NOTE — PROGRESS NOTES
[facility] Hospitalist Progress Note    Admitting Date and Time: 3/27/2024 10:43 AM  Admit Dx: COVID-19 virus infection [U07.1]    Subjective:  Patient is being followed for COVID-19 virus infection [U07.1]   Pt was seen and examined today. Denies any new issues    ROS: denies fever, chills, cp, sob, n/v, HA unless stated above.     warfarin  10 mg Oral Once    mineral oil-hydrophilic petrolatum   Topical Daily    enoxaparin  130 mg SubCUTAneous BID    lisinopril  5 mg Oral QAM    atorvastatin  20 mg Oral QAM    insulin lispro  0-4 Units SubCUTAneous TID WC    insulin lispro  0-4 Units SubCUTAneous Nightly    warfarin placeholder: dosing by pharmacy   Other RX Placeholder    sodium chloride flush  5-40 mL IntraVENous 2 times per day     Glucose, 15 g, PRN  dextrose bolus, 125 mL, PRN   Or  dextrose bolus, 250 mL, PRN  glucagon (rDNA), 1 mg, PRN  dextrose, , Continuous PRN  valACYclovir, 1,000 mg, Daily PRN  sodium chloride flush, 5-40 mL, PRN  sodium chloride, , PRN  ondansetron, 4 mg, Q8H PRN   Or  ondansetron, 4 mg, Q6H PRN  polyethylene glycol, 17 g, Daily PRN  acetaminophen, 650 mg, Q6H PRN   Or  acetaminophen, 650 mg, Q6H PRN  HYDROcodone 5 mg - acetaminophen, 1 tablet, Q6H PRN         Objective:    /61   Pulse 63   Temp 98.1 °F (36.7 °C) (Oral)   Resp 18   Ht 2.108 m (6' 11\")   Wt 132.9 kg (293 lb)   SpO2 93%   BMI 29.90 kg/m²     General Appearance: alert and oriented to person, place and time and in no acute distress  Skin: warm and dry  Head: normocephalic and atraumatic  Pulmonary/Chest: clear to auscultation bilaterally- no wheezes, rales or rhonchi, normal air movement, no respiratory distress  Cardiovascular: normal rate, normal S1 and S2  Abdomen: soft, non-tender, non-distended, normal bowel sounds  Extremities: BLE stasis dermatitis skin changes, BLE 2+ edema noted      Recent Labs     03/29/24  0410 03/30/24  0338 03/31/24  0230    133 138   K 4.4 4.5 4.3    101 103

## 2024-04-01 LAB
ANION GAP SERPL CALCULATED.3IONS-SCNC: 9 MMOL/L (ref 7–16)
BASOPHILS # BLD: 0 K/UL (ref 0–0.2)
BASOPHILS NFR BLD: 0 % (ref 0–2)
BUN SERPL-MCNC: 14 MG/DL (ref 6–20)
CALCIUM SERPL-MCNC: 8.5 MG/DL (ref 8.6–10.2)
CARDIOLIPIN IGA SER IA-ACNC: 2.9 APL (ref 0–14)
CARDIOLIPIN IGG SER IA-ACNC: 0.9 GPL (ref 0–10)
CARDIOLIPIN IGM SER IA-ACNC: 1.7 MPL (ref 0–10)
CHLORIDE SERPL-SCNC: 104 MMOL/L (ref 98–107)
CO2 SERPL-SCNC: 25 MMOL/L (ref 22–29)
CREAT SERPL-MCNC: 0.8 MG/DL (ref 0.7–1.2)
EOSINOPHIL # BLD: 0.21 K/UL (ref 0.05–0.5)
EOSINOPHILS RELATIVE PERCENT: 6 % (ref 0–6)
ERYTHROCYTE [DISTWIDTH] IN BLOOD BY AUTOMATED COUNT: 13.9 % (ref 11.5–15)
F5 P.R506Q BLD/T QL: NEGATIVE
GFR SERPL CREATININE-BSD FRML MDRD: >90 ML/MIN/1.73M2
GLUCOSE BLD-MCNC: 128 MG/DL (ref 74–99)
GLUCOSE BLD-MCNC: 153 MG/DL (ref 74–99)
GLUCOSE BLD-MCNC: 192 MG/DL (ref 74–99)
GLUCOSE SERPL-MCNC: 164 MG/DL (ref 74–99)
HCT VFR BLD AUTO: 43.4 % (ref 37–54)
HGB BLD-MCNC: 14.2 G/DL (ref 12.5–16.5)
INR PPP: 1.6
LYMPHOCYTES NFR BLD: 1.49 K/UL (ref 1.5–4)
LYMPHOCYTES RELATIVE PERCENT: 39 % (ref 20–42)
MCH RBC QN AUTO: 31.3 PG (ref 26–35)
MCHC RBC AUTO-ENTMCNC: 32.7 G/DL (ref 32–34.5)
MCV RBC AUTO: 95.8 FL (ref 80–99.9)
MONOCYTES NFR BLD: 0.17 K/UL (ref 0.1–0.95)
MONOCYTES NFR BLD: 5 % (ref 2–12)
NEUTROPHILS NFR BLD: 51 % (ref 43–80)
NEUTS SEG NFR BLD: 1.93 K/UL (ref 1.8–7.3)
NUCLEATED RED BLOOD CELLS: 1 PER 100 WBC
PLATELET # BLD AUTO: 91 K/UL (ref 130–450)
PLATELET CONFIRMATION: NORMAL
PMV BLD AUTO: 10.3 FL (ref 7–12)
POTASSIUM SERPL-SCNC: 4.4 MMOL/L (ref 3.5–5)
PROTHROMBIN TIME: 18.4 SEC (ref 9.3–12.4)
RBC # BLD AUTO: 4.53 M/UL (ref 3.8–5.8)
RBC # BLD: ABNORMAL 10*6/UL
SODIUM SERPL-SCNC: 138 MMOL/L (ref 132–146)
SPECIMEN SOURCE: NORMAL
WBC OTHER # BLD: 3.8 K/UL (ref 4.5–11.5)

## 2024-04-01 PROCEDURE — 2580000003 HC RX 258: Performed by: INTERNAL MEDICINE

## 2024-04-01 PROCEDURE — 85025 COMPLETE CBC W/AUTO DIFF WBC: CPT

## 2024-04-01 PROCEDURE — 6370000000 HC RX 637 (ALT 250 FOR IP): Performed by: STUDENT IN AN ORGANIZED HEALTH CARE EDUCATION/TRAINING PROGRAM

## 2024-04-01 PROCEDURE — 80048 BASIC METABOLIC PNL TOTAL CA: CPT

## 2024-04-01 PROCEDURE — 82962 GLUCOSE BLOOD TEST: CPT

## 2024-04-01 PROCEDURE — 36415 COLL VENOUS BLD VENIPUNCTURE: CPT

## 2024-04-01 PROCEDURE — 99232 SBSQ HOSP IP/OBS MODERATE 35: CPT | Performed by: STUDENT IN AN ORGANIZED HEALTH CARE EDUCATION/TRAINING PROGRAM

## 2024-04-01 PROCEDURE — 6370000000 HC RX 637 (ALT 250 FOR IP): Performed by: CLINICAL NURSE SPECIALIST

## 2024-04-01 PROCEDURE — 1200000000 HC SEMI PRIVATE

## 2024-04-01 PROCEDURE — 6360000002 HC RX W HCPCS: Performed by: STUDENT IN AN ORGANIZED HEALTH CARE EDUCATION/TRAINING PROGRAM

## 2024-04-01 PROCEDURE — 85610 PROTHROMBIN TIME: CPT

## 2024-04-01 RX ADMIN — PETROLATUM: 420 OINTMENT TOPICAL at 09:05

## 2024-04-01 RX ADMIN — GUAIFENESIN 400 MG: 400 TABLET ORAL at 08:58

## 2024-04-01 RX ADMIN — GUAIFENESIN 400 MG: 400 TABLET ORAL at 21:43

## 2024-04-01 RX ADMIN — SODIUM CHLORIDE, PRESERVATIVE FREE 10 ML: 5 INJECTION INTRAVENOUS at 08:58

## 2024-04-01 RX ADMIN — ENOXAPARIN SODIUM 130 MG: 150 INJECTION SUBCUTANEOUS at 08:58

## 2024-04-01 RX ADMIN — ENOXAPARIN SODIUM 130 MG: 150 INJECTION SUBCUTANEOUS at 21:43

## 2024-04-01 RX ADMIN — ATORVASTATIN CALCIUM 20 MG: 20 TABLET, FILM COATED ORAL at 08:58

## 2024-04-01 RX ADMIN — SODIUM CHLORIDE, PRESERVATIVE FREE 10 ML: 5 INJECTION INTRAVENOUS at 21:43

## 2024-04-01 RX ADMIN — LISINOPRIL 5 MG: 5 TABLET ORAL at 08:58

## 2024-04-01 RX ADMIN — WARFARIN SODIUM 7.5 MG: 2.5 TABLET ORAL at 18:36

## 2024-04-01 NOTE — PATIENT CARE CONFERENCE
[facility] Quality Flow/Interdisciplinary Rounds Progress Note        Quality Flow Rounds held on April 1, 2024    Disciplines Attending:  Bedside Nurse, , , and Nursing Unit Leadership    Pa Lugo was admitted on 3/27/2024 10:43 AM    Anticipated Discharge Date:       Disposition:    Ganesh Score:  Ganesh Scale Score: 19    Readmission Risk              Risk of Unplanned Readmission:  11           Discussed patient goal for the day, patient clinical progression, and barriers to discharge.  The following Goal(s) of the Day/Commitment(s) have been identified:  Labs - Report Results      Alanna Egan RN  April 1, 2024

## 2024-04-01 NOTE — PROGRESS NOTES
Pharmacy Consultation Note  (Warfarin Dosing and Monitoring)    Initial consult date: 3/28  Consulting Provider: Marisela Silvestre MOLLY Lugo is a 58 y.o. male for whom pharmacy has been asked to manage warfarin therapy.     Weight:   Wt Readings from Last 1 Encounters:   03/31/24 132.9 kg (293 lb)       TSH:  No results found for: \"TSH\"    Hepatic Function Panel:                            Lab Results   Component Value Date/Time    ALKPHOS 81 03/31/2024 02:30 AM    ALT 21 03/31/2024 02:30 AM    AST 20 03/31/2024 02:30 AM    PROT 6.0 03/31/2024 02:30 AM    BILITOT 0.3 03/31/2024 02:30 AM    BILIDIR 0.2 07/13/2011 09:25 PM    LABALBU 3.9 03/31/2024 02:30 AM    LABALBU 4.3 09/21/2011 06:00 AM           Recent Labs     03/30/24  0338 03/31/24  0230 04/01/24  0514   HGB 14.6 15.0 14.2   *  --  91*       Date Warfarin Dose INR Heparin or LMWH Comment   3/28 10 mg 1.0 Enoxaparin 130 mg subQ twice daily    3/29 10 mg 1.0 Enoxaparin 130 mg subQ twice daily    3/30 10 mg 1.1 Enoxaparin 130 mg subQ twice daily    3/31 10 mg 1.2 Enoxaparin 130 mg subQ twice daily    4/1 7.5 mg 1.6 Enoxaparin 130 mg subQ twice daily                    Assessment:  Patient is a 58 y.o. male on warfarin for New Venous Thromboembolism.  Patient was previously on apixaban, but will now is on therapeutic enoxaparin to be bridged to warfarin.  Goal INR 2 - 3  INR 1.6 today    Plan:  Warfarin 7.5 mg tonight as I expect INR to be therapeutic (or near therapeutic) tomorrow.  Daily PT/INR until the INR is stable within the therapeutic range  Pharmacist will follow and monitor/adjust dosing as necessary      Rakan Serna RPH 4/1/2024 12:51 PM    SEB: [phone]  SEY: [phone]  SJW: [phone]

## 2024-04-01 NOTE — PROGRESS NOTES
[facility] Hospitalist Progress Note    Admitting Date and Time: 3/27/2024 10:43 AM  Admit Dx: COVID-19 virus infection [U07.1]    Subjective:  Patient is being followed for COVID-19 virus infection [U07.1]   Pt was seen and examined today. Denies any new issues    ROS: denies fever, chills, cp, sob, n/v, HA unless stated above.     warfarin  7.5 mg Oral Once    white petrolatum   Topical Daily    guaiFENesin  400 mg Oral BID    enoxaparin  130 mg SubCUTAneous BID    lisinopril  5 mg Oral QAM    atorvastatin  20 mg Oral QAM    insulin lispro  0-4 Units SubCUTAneous TID WC    insulin lispro  0-4 Units SubCUTAneous Nightly    warfarin placeholder: dosing by pharmacy   Other RX Placeholder    sodium chloride flush  5-40 mL IntraVENous 2 times per day     Glucose, 15 g, PRN  dextrose bolus, 125 mL, PRN   Or  dextrose bolus, 250 mL, PRN  glucagon (rDNA), 1 mg, PRN  dextrose, , Continuous PRN  valACYclovir, 1,000 mg, Daily PRN  sodium chloride flush, 5-40 mL, PRN  sodium chloride, , PRN  ondansetron, 4 mg, Q8H PRN   Or  ondansetron, 4 mg, Q6H PRN  polyethylene glycol, 17 g, Daily PRN  acetaminophen, 650 mg, Q6H PRN   Or  acetaminophen, 650 mg, Q6H PRN  HYDROcodone 5 mg - acetaminophen, 1 tablet, Q6H PRN         Objective:    /68   Pulse 68   Temp 98.5 °F (36.9 °C) (Oral)   Resp 18   Ht 2.108 m (6' 11\")   Wt 132.9 kg (293 lb)   SpO2 95%   BMI 29.90 kg/m²     General Appearance: alert and oriented to person, place and time and in no acute distress  Skin: warm and dry  Head: normocephalic and atraumatic  Pulmonary/Chest: clear to auscultation bilaterally- no wheezes, rales or rhonchi, normal air movement, no respiratory distress  Cardiovascular: normal rate, normal S1 and S2  Abdomen: soft, non-tender, non-distended, normal bowel sounds  Extremities: BLE stasis dermatitis skin changes, BLE 2+ edema noted      Recent Labs     03/30/24  0338 03/31/24  0230 04/01/24  0514    138 138   K 4.5 4.3 4.4

## 2024-04-01 NOTE — CARE COORDINATION
Social Work discharge planning  Pt continues to plan for transition to home at discharge. He is on room air and reports ambulatory independence. Needs not identified presently.  Electronically signed by CHARANJIT Carter on 4/1/2024 at 12:45 PM

## 2024-04-02 VITALS
WEIGHT: 299.38 LBS | TEMPERATURE: 98 F | OXYGEN SATURATION: 96 % | DIASTOLIC BLOOD PRESSURE: 65 MMHG | HEART RATE: 58 BPM | SYSTOLIC BLOOD PRESSURE: 117 MMHG | BODY MASS INDEX: 34.64 KG/M2 | RESPIRATION RATE: 18 BRPM | HEIGHT: 78 IN

## 2024-04-02 LAB
ANION GAP SERPL CALCULATED.3IONS-SCNC: 12 MMOL/L (ref 7–16)
BASOPHILS # BLD: 0.02 K/UL (ref 0–0.2)
BASOPHILS NFR BLD: 0 % (ref 0–2)
BUN SERPL-MCNC: 13 MG/DL (ref 6–20)
CALCIUM SERPL-MCNC: 8.7 MG/DL (ref 8.6–10.2)
CHLORIDE SERPL-SCNC: 103 MMOL/L (ref 98–107)
CO2 SERPL-SCNC: 22 MMOL/L (ref 22–29)
CREAT SERPL-MCNC: 0.7 MG/DL (ref 0.7–1.2)
EOSINOPHIL # BLD: 0.06 K/UL (ref 0.05–0.5)
EOSINOPHILS RELATIVE PERCENT: 1 % (ref 0–6)
ERYTHROCYTE [DISTWIDTH] IN BLOOD BY AUTOMATED COUNT: 14 % (ref 11.5–15)
GFR SERPL CREATININE-BSD FRML MDRD: >90 ML/MIN/1.73M2
GLUCOSE BLD-MCNC: 167 MG/DL (ref 74–99)
GLUCOSE BLD-MCNC: 180 MG/DL (ref 74–99)
GLUCOSE SERPL-MCNC: 280 MG/DL (ref 74–99)
HCT VFR BLD AUTO: 44 % (ref 37–54)
HGB BLD-MCNC: 14.4 G/DL (ref 12.5–16.5)
IMM GRANULOCYTES # BLD AUTO: <0.03 K/UL (ref 0–0.58)
IMM GRANULOCYTES NFR BLD: 0 % (ref 0–5)
INR PPP: 1.7
LYMPHOCYTES NFR BLD: 1.79 K/UL (ref 1.5–4)
LYMPHOCYTES RELATIVE PERCENT: 40 % (ref 20–42)
MCH RBC QN AUTO: 31.1 PG (ref 26–35)
MCHC RBC AUTO-ENTMCNC: 32.7 G/DL (ref 32–34.5)
MCV RBC AUTO: 95 FL (ref 80–99.9)
MONOCYTES NFR BLD: 0.34 K/UL (ref 0.1–0.95)
MONOCYTES NFR BLD: 8 % (ref 2–12)
MTHFR INTERPRETATION: NORMAL
MTHFR MUTATION A1286C: NORMAL
MTHFR MUTATION C665T: NORMAL
NEUTROPHILS NFR BLD: 51 % (ref 43–80)
NEUTS SEG NFR BLD: 2.31 K/UL (ref 1.8–7.3)
PAI-1 INTERPRETATION: NORMAL
PLASMINOGEN ACT. INHIBITOR-1 (PAI-1) SPECIMEN: NORMAL
PLATELET CONFIRMATION: NORMAL
PLATELET, FLUORESCENCE: 98 K/UL (ref 130–450)
PMV BLD AUTO: 10.3 FL (ref 7–12)
POTASSIUM SERPL-SCNC: 4 MMOL/L (ref 3.5–5)
PROTHROMBIN TIME: 19.1 SEC (ref 9.3–12.4)
RBC # BLD AUTO: 4.63 M/UL (ref 3.8–5.8)
SODIUM SERPL-SCNC: 137 MMOL/L (ref 132–146)
SPECIMEN: NORMAL
WBC OTHER # BLD: 4.5 K/UL (ref 4.5–11.5)

## 2024-04-02 PROCEDURE — 2580000003 HC RX 258: Performed by: INTERNAL MEDICINE

## 2024-04-02 PROCEDURE — 36415 COLL VENOUS BLD VENIPUNCTURE: CPT

## 2024-04-02 PROCEDURE — 85025 COMPLETE CBC W/AUTO DIFF WBC: CPT

## 2024-04-02 PROCEDURE — 99239 HOSP IP/OBS DSCHRG MGMT >30: CPT | Performed by: STUDENT IN AN ORGANIZED HEALTH CARE EDUCATION/TRAINING PROGRAM

## 2024-04-02 PROCEDURE — 6360000002 HC RX W HCPCS: Performed by: STUDENT IN AN ORGANIZED HEALTH CARE EDUCATION/TRAINING PROGRAM

## 2024-04-02 PROCEDURE — 80048 BASIC METABOLIC PNL TOTAL CA: CPT

## 2024-04-02 PROCEDURE — 85610 PROTHROMBIN TIME: CPT

## 2024-04-02 PROCEDURE — 6370000000 HC RX 637 (ALT 250 FOR IP): Performed by: STUDENT IN AN ORGANIZED HEALTH CARE EDUCATION/TRAINING PROGRAM

## 2024-04-02 PROCEDURE — 82962 GLUCOSE BLOOD TEST: CPT

## 2024-04-02 RX ORDER — WARFARIN SODIUM 5 MG/1
10 TABLET ORAL DAILY
Qty: 30 TABLET | Refills: 0 | Status: SHIPPED | OUTPATIENT
Start: 2024-04-02

## 2024-04-02 RX ORDER — ENOXAPARIN SODIUM 150 MG/ML
130 INJECTION SUBCUTANEOUS 2 TIMES DAILY
Qty: 14 ML | Refills: 0 | Status: SHIPPED | OUTPATIENT
Start: 2024-04-02

## 2024-04-02 RX ORDER — GUAIFENESIN 400 MG/1
400 TABLET ORAL 2 TIMES DAILY PRN
Qty: 56 TABLET | Refills: 0 | Status: SHIPPED | OUTPATIENT
Start: 2024-04-02

## 2024-04-02 RX ORDER — WARFARIN SODIUM 5 MG/1
10 TABLET ORAL
Status: DISCONTINUED | OUTPATIENT
Start: 2024-04-02 | End: 2024-04-02 | Stop reason: HOSPADM

## 2024-04-02 RX ADMIN — SODIUM CHLORIDE, PRESERVATIVE FREE 10 ML: 5 INJECTION INTRAVENOUS at 09:27

## 2024-04-02 RX ADMIN — ENOXAPARIN SODIUM 130 MG: 150 INJECTION SUBCUTANEOUS at 10:08

## 2024-04-02 RX ADMIN — ATORVASTATIN CALCIUM 20 MG: 20 TABLET, FILM COATED ORAL at 09:27

## 2024-04-02 RX ADMIN — LISINOPRIL 5 MG: 5 TABLET ORAL at 09:27

## 2024-04-02 RX ADMIN — PETROLATUM: 420 OINTMENT TOPICAL at 09:28

## 2024-04-02 RX ADMIN — GUAIFENESIN 400 MG: 400 TABLET ORAL at 09:27

## 2024-04-02 NOTE — PROGRESS NOTES
Pharmacy Consultation Note  (Warfarin Dosing and Monitoring)    Initial consult date: 3/28  Consulting Provider: Marisela Silvestre MOLLY Lugo is a 58 y.o. male for whom pharmacy has been asked to manage warfarin therapy.     Weight:   Wt Readings from Last 1 Encounters:   04/02/24 135.8 kg (299 lb 6.2 oz)       TSH:  No results found for: \"TSH\"    Hepatic Function Panel:                            Lab Results   Component Value Date/Time    ALKPHOS 81 03/31/2024 02:30 AM    ALT 21 03/31/2024 02:30 AM    AST 20 03/31/2024 02:30 AM    PROT 6.0 03/31/2024 02:30 AM    BILITOT 0.3 03/31/2024 02:30 AM    BILIDIR 0.2 07/13/2011 09:25 PM    LABALBU 3.9 03/31/2024 02:30 AM    LABALBU 4.3 09/21/2011 06:00 AM           Recent Labs     03/31/24  0230 04/01/24  0514 04/02/24  0805   HGB 15.0 14.2 14.4   PLT  --  91*  --        Date Warfarin Dose INR Heparin or LMWH Comment   3/28 10 mg 1.0 Enoxaparin 130 mg subQ twice daily    3/29 10 mg 1.0 Enoxaparin 130 mg subQ twice daily    3/30 10 mg 1.1 Enoxaparin 130 mg subQ twice daily    3/31 10 mg 1.2 Enoxaparin 130 mg subQ twice daily    4/1 7.5 mg 1.6 Enoxaparin 130 mg subQ twice daily    4/2 10 mg 1.7 Enoxaparin 130 mg subQ twice daily                           Assessment:  Patient is a 58 y.o. male on warfarin for New Venous Thromboembolism.  Patient was previously on apixaban, but will now is on therapeutic enoxaparin to be bridged to warfarin.  Goal INR 2 - 3  INR 1.7 today    Plan:  Warfarin 10 mg tonight, as INR remains subtherapeutic.  Daily PT/INR until the INR is stable within the therapeutic range  Pharmacist will follow and monitor/adjust dosing as necessary      Rakan Serna RPH 4/2/2024 11:26 AM    SEB: [phone]  SEY: [phone]  SJW: [phone]

## 2024-04-02 NOTE — DISCHARGE SUMMARY
[facility] Hospitalist Physician Discharge Summary       Pa Branch MD  [address] [address]  [phone]    Follow up      Brady Venegas III, MD  [address] [id number] [phone]    Schedule an appointment as soon as possible for a visit in 1 week(s)  Hospital Follow up    Gregory Cool MD  [address] [id number] [phone]    Follow up  follow up, As needed      Activity level: As tolerated     Dispo: Home      Condition on discharge: Stable     Patient ID:  Pa Lugo  [id number]  58 y.o.  1965    Admit date: 3/27/2024    Discharge date and time:  4/2/2024  5:33 PM    Admission Diagnoses: Principal Problem:    COVID-19 virus infection  Active Problems:    Acute deep vein thrombosis (DVT) of right lower extremity (HCC)    Hyperlipidemia    Primary hypertension  Resolved Problems:    * No resolved hospital problems. *      Discharge Diagnoses: Principal Problem:    COVID-19 virus infection  Active Problems:    Acute deep vein thrombosis (DVT) of right lower extremity (HCC)    Hyperlipidemia    Primary hypertension  Resolved Problems:    * No resolved hospital problems. *      Consults:  IP CONSULT TO HEM/ONC  IP CONSULT TO VASCULAR SURGERY  IP CONSULT TO PHARMACY    Procedures: EKG, US RLE    Hospital Course:   Patient Pa Lugo is a 58 y.o. presented with COVID-19 virus infection [U07.1]  Pt has PMHx Asthma, PATSY. He presented with SOB, Chills, pain behind knees, worsening RLE swelling. Symptom onset on admission day, patient found to have COVID-19, but on room air and stable, symptoms resolved with supportive care on day of discharge. Also found to have extensive RLE DVT, he was already on Eliquis. Placed on Lovenox and bridged to Warfarin. On day of discharge INR 1.7, was discharge on Lovenox and Warfarin 10 mg daily, will have close follow up with PCP and hematology.    Discharge Exam:    General Appearance: alert and

## 2024-04-02 NOTE — PATIENT CARE CONFERENCE
P Quality Flow/Interdisciplinary Rounds Progress Note        Quality Flow Rounds held on April 2, 2024    Disciplines Attending:  Bedside Nurse, , , and Nursing Unit Leadership    Pa Lugo was admitted on 3/27/2024 10:43 AM    Anticipated Discharge Date:       Disposition:    Ganesh Score:  Ganesh Scale Score: 20    Readmission Risk              Risk of Unplanned Readmission:  10           Discussed patient goal for the day, patient clinical progression, and barriers to discharge.  The following Goal(s) of the Day/Commitment(s) have been identified:  Labs - Report Results      Alanna Egan RN  April 2, 2024

## 2024-04-02 NOTE — CARE COORDINATION
Social Work discharge planning  Outpt Pharm[facility] [phone] advised Lovenox cost for pt is $10 copay. However, they transferred to pt's pharmacy because of stock. Charge RN Alanna notified.  Electronically signed by CHARANJIT Carter on 4/2/2024 at 1:55 PM

## 2024-04-02 NOTE — PLAN OF CARE
Problem: Pain  Goal: Verbalizes/displays adequate comfort level or baseline comfort level  4/1/2024 2338 by Babs Figueroa  Outcome: Progressing  4/1/2024 1652 by Moshe Peters RN  Outcome: Progressing     Problem: Safety - Adult  Goal: Free from fall injury  4/1/2024 2338 by Babs Figueroa  Outcome: Progressing  4/1/2024 1652 by Moshe Peters RN  Outcome: Progressing     Problem: ABCDS Injury Assessment  Goal: Absence of physical injury  4/1/2024 2338 by Babs Figueroa  Outcome: Progressing  4/1/2024 1652 by Moshe Peters RN  Outcome: Progressing     Problem: Discharge Planning  Goal: Discharge to home or other facility with appropriate resources  4/1/2024 2338 by Babs Figueroa  Outcome: Progressing  4/1/2024 1652 by Moshe Peters RN  Outcome: Progressing     Problem: Chronic Conditions and Co-morbidities  Goal: Patient's chronic conditions and co-morbidity symptoms are monitored and maintained or improved  4/1/2024 2338 by Babs Figueroa  Outcome: Progressing  4/1/2024 1652 by Moshe Peters RN  Outcome: Progressing

## 2024-04-02 NOTE — PROGRESS NOTES
CLINICAL PHARMACY NOTE: MEDS TO BEDS    Total # of Prescriptions Filled: 2   The following medications were delivered to the patient:  Chest congestion 400 mg  Warfarin 5 mg    Additional Documentation:  Lovenox transferred to [facility] [address]

## 2024-04-03 LAB
B2 GLYCOPROT1 IGA SERPL IA-ACNC: 1.4 ELISA U/ML (ref 0–7)
B2 GLYCOPROT1 IGG SERPL IA-ACNC: <0.6 ELISA U/ML (ref 0–7)
B2 GLYCOPROT1 IGM SERPL IA-ACNC: 28 ELISA U/ML (ref 0–7)

## 2024-04-04 LAB
F2 C.20210G>A GENO BLD/T: NEGATIVE
FACTOR XIII (F13A1) V34L VARIANT: NEGATIVE
FACTOR XIII VARIANT SPECIMEN: NORMAL
SPECIMEN SOURCE: NORMAL

## 2024-04-29 ENCOUNTER — HOSPITAL ENCOUNTER (OUTPATIENT)
Dept: INFUSION THERAPY | Age: 59
Discharge: HOME | End: 2024-04-29

## 2024-04-29 ENCOUNTER — OFFICE VISIT (OUTPATIENT)
Dept: ONCOLOGY | Age: 59
End: 2024-04-29
Payer: COMMERCIAL

## 2024-04-29 VITALS
HEART RATE: 80 BPM | WEIGHT: 298 LBS | OXYGEN SATURATION: 98 % | HEIGHT: 78 IN | TEMPERATURE: 97 F | BODY MASS INDEX: 34.48 KG/M2 | SYSTOLIC BLOOD PRESSURE: 130 MMHG | DIASTOLIC BLOOD PRESSURE: 72 MMHG

## 2024-04-29 DIAGNOSIS — R76.0 ANTIPHOSPHOLIPID ANTIBODY POSITIVE: ICD-10-CM

## 2024-04-29 DIAGNOSIS — I82.4Y1 ACUTE DEEP VEIN THROMBOSIS (DVT) OF PROXIMAL VEIN OF RIGHT LOWER EXTREMITY (HCC): Primary | ICD-10-CM

## 2024-04-29 PROCEDURE — 99204 OFFICE O/P NEW MOD 45 MIN: CPT | Performed by: STUDENT IN AN ORGANIZED HEALTH CARE EDUCATION/TRAINING PROGRAM

## 2024-04-29 PROCEDURE — 99214 OFFICE O/P EST MOD 30 MIN: CPT

## 2024-04-29 PROCEDURE — 3075F SYST BP GE 130 - 139MM HG: CPT | Performed by: STUDENT IN AN ORGANIZED HEALTH CARE EDUCATION/TRAINING PROGRAM

## 2024-04-29 PROCEDURE — 3078F DIAST BP <80 MM HG: CPT | Performed by: STUDENT IN AN ORGANIZED HEALTH CARE EDUCATION/TRAINING PROGRAM

## 2024-04-29 ASSESSMENT — ENCOUNTER SYMPTOMS
RESPIRATORY NEGATIVE: 1
GASTROINTESTINAL NEGATIVE: 1

## 2024-04-29 NOTE — PROGRESS NOTES
Pa Lugo  1965 58 y.o.      Referring Physician:     PCP: Brady Venegas III, MD    Vitals:    04/29/24 1028   BP: 130/72   Pulse: 80   Temp: 97 °F (36.1 °C)   SpO2: 98%        Wt Readings from Last 3 Encounters:   04/29/24 135.2 kg (298 lb)   04/02/24 135.8 kg (299 lb 6.2 oz)   06/19/23 (!) 138.3 kg (305 lb)        Body mass index is 30.41 kg/m².          Chief Complaint:   Chief Complaint   Patient presents with    New Patient     MTHFR                   Current Outpatient Medications:     warfarin (COUMADIN) 5 MG tablet, Take 2 tablets by mouth daily, Disp: 30 tablet, Rfl: 0    guaiFENesin 400 MG tablet, Take 1 tablet by mouth 2 times daily as needed for Cough, Disp: 56 tablet, Rfl: 0    tadalafil (CIALIS) 20 MG tablet, Take 1 tablet by mouth as needed for Erectile Dysfunction, Disp: , Rfl:     SITagliptin (JANUVIA) 100 MG tablet, Take 1 tablet by mouth every morning, Disp: , Rfl:     lisinopril (PRINIVIL;ZESTRIL) 5 MG tablet, Take 1 tablet by mouth every morning, Disp: , Rfl:     valACYclovir (VALTREX) 1 g tablet, Take 1 tablet by mouth daily as needed (IMMUNE SUPPORT), Disp: , Rfl:     dapagliflozin (FARXIGA) 10 MG tablet, Take 1 tablet by mouth every morning, Disp: , Rfl:     atorvastatin (LIPITOR) 40 MG tablet, Take 0.5 tablets by mouth every morning, Disp: , Rfl:     enoxaparin (LOVENOX) 150 MG/ML SOSY injection, Inject 0.87 mLs into the skin 2 times daily (Patient not taking: Reported on 4/29/2024), Disp: 14 mL, Rfl: 0       Past Medical History:   Diagnosis Date    Asthma     Sleep apnea     Swelling of extremity, left     chronic left leg swelling       Past Surgical History:   Procedure Laterality Date    APPENDECTOMY      CATARACT EXTRACTION      DENTAL SURGERY      teet removed    HC  INCISION AND DRAINAGE ABSCESS SOFT TISSUE DEEP  09/24/2011         TONSILLECTOMY  01/01/2000       History reviewed. No pertinent family history.    Social History     Socioeconomic History    Marital

## 2024-04-29 NOTE — PROGRESS NOTES
[facility] [place] [facility] MEDICAL ONCOLOGY  [address] [address]  Dept: [phone]  Loc: [phone]    Clinic Consultation Note      Date of Encounter: 04/29/2024     Referring Provider:  Mike Velarde DO (inpatient consultation)    Reason for Visit:   Right leg DVT        PCP:  Brady Venegas III, MD    Demographics: 58 y.o. male    Chief Complaint   Patient presents with    New Patient     MTHFR         Subjective:  Returns after being seen in the hospital for DVT.  He was successfully transitioned to warfarin, tolerating well without major bleeding issues.  He suggest that his INR is within therapeutic range.  He was accompanied by his wife today.    History of Present Illness of Initial Inpatient Consultation (3/28/24):  Pa Lugo is a 58-year-old male who presented to the ED on 3/27/2024 For for shortness of breath,  pain behind knees and bodyaches, beginning earlier that day at 1 AM.  Patient has history of asthma, PATSY, current smoker.  He reports he developed chest pain, abdominal pain chills, ear pain pressure congestion cough and pain by the right knee he is currently on Eliquis for DVT diagnosed April 2023 right DVT,history of left DVT 20 years ago 6 months of Coumadin then was to be on ASA was stopped at some point.  Workup reveals positive COVID-19 test at admission , ultrasound right lower extremity positive for occlusive thrombus in the proximal mid femoral vein popliteal vein and popliteal trunk extending mid calf of nonocclusive thrombus in the deep femoral vein posterior tibial vein and peroneal veins.  Does have a popliteal cyst.  CTA pulmonary done no pulmonary emboli seen.  Patient is compliant with his CPAP machine q. night.  Patient reports he is compliant with Eliquis 5 mg twice daily has not missed a dose .  Our team has been consulted for recurrent DVT while on DOAC.     Pt seen in room, he appears in NAD , some

## 2024-07-26 ENCOUNTER — TELEPHONE (OUTPATIENT)
Dept: VASCULAR SURGERY | Age: 59
End: 2024-07-26

## 2024-07-26 NOTE — TELEPHONE ENCOUNTER
Spoke with the pt regarding scheduled 8/12/24 office visit with Dr. Cool.  He doesn't need to see us as Dr. Branch, hematology, has recommended him lifelong anticoagulation, appointment cancelled with instruction to call prn.

## 2025-03-03 ENCOUNTER — HOSPITAL ENCOUNTER (EMERGENCY)
Age: 60
Discharge: HOME | End: 2025-03-03
Payer: COMMERCIAL

## 2025-03-03 ENCOUNTER — APPOINTMENT (OUTPATIENT)
Dept: GENERAL RADIOLOGY | Age: 60
End: 2025-03-03
Payer: COMMERCIAL

## 2025-03-03 VITALS
HEIGHT: 78 IN | RESPIRATION RATE: 16 BRPM | OXYGEN SATURATION: 96 % | HEART RATE: 94 BPM | BODY MASS INDEX: 36.45 KG/M2 | TEMPERATURE: 98.4 F | DIASTOLIC BLOOD PRESSURE: 73 MMHG | WEIGHT: 315 LBS | SYSTOLIC BLOOD PRESSURE: 144 MMHG

## 2025-03-03 DIAGNOSIS — S60.512A ABRASION OF LEFT HAND, INITIAL ENCOUNTER: ICD-10-CM

## 2025-03-03 DIAGNOSIS — S69.92XA INJURY OF LEFT HAND, INITIAL ENCOUNTER: Primary | ICD-10-CM

## 2025-03-03 PROCEDURE — 73130 X-RAY EXAM OF HAND: CPT

## 2025-03-03 PROCEDURE — 99284 EMERGENCY DEPT VISIT MOD MDM: CPT

## 2025-03-03 PROCEDURE — 90714 TD VACC NO PRESV 7 YRS+ IM: CPT | Performed by: NURSE PRACTITIONER

## 2025-03-03 PROCEDURE — 6370000000 HC RX 637 (ALT 250 FOR IP): Performed by: NURSE PRACTITIONER

## 2025-03-03 PROCEDURE — 6360000002 HC RX W HCPCS: Performed by: NURSE PRACTITIONER

## 2025-03-03 PROCEDURE — 90471 IMMUNIZATION ADMIN: CPT | Performed by: NURSE PRACTITIONER

## 2025-03-03 RX ORDER — FLUTICASONE PROPIONATE 50 MCG
1 SPRAY, SUSPENSION (ML) NASAL DAILY PRN
COMMUNITY

## 2025-03-03 RX ORDER — ALBUTEROL SULFATE 90 UG/1
2 INHALANT RESPIRATORY (INHALATION) EVERY 6 HOURS PRN
COMMUNITY

## 2025-03-03 RX ORDER — BACITRACIN ZINC 500 [USP'U]/G
OINTMENT TOPICAL ONCE
Status: COMPLETED | OUTPATIENT
Start: 2025-03-03 | End: 2025-03-03

## 2025-03-03 RX ADMIN — BACITRACIN ZINC: 500 OINTMENT TOPICAL at 18:17

## 2025-03-03 RX ADMIN — CLOSTRIDIUM TETANI TOXOID ANTIGEN (FORMALDEHYDE INACTIVATED) AND CORYNEBACTERIUM DIPHTHERIAE TOXOID ANTIGEN (FORMALDEHYDE INACTIVATED) 0.5 ML: 5; 2 INJECTION, SUSPENSION INTRAMUSCULAR at 17:38

## 2025-03-03 ASSESSMENT — PAIN - FUNCTIONAL ASSESSMENT
PAIN_FUNCTIONAL_ASSESSMENT: PREVENTS OR INTERFERES SOME ACTIVE ACTIVITIES AND ADLS
PAIN_FUNCTIONAL_ASSESSMENT: 0-10

## 2025-03-03 ASSESSMENT — PAIN DESCRIPTION - ORIENTATION: ORIENTATION: LEFT

## 2025-03-03 ASSESSMENT — PAIN DESCRIPTION - PAIN TYPE: TYPE: ACUTE PAIN

## 2025-03-03 ASSESSMENT — PAIN DESCRIPTION - FREQUENCY: FREQUENCY: CONTINUOUS

## 2025-03-03 ASSESSMENT — PAIN DESCRIPTION - ONSET: ONSET: ON-GOING

## 2025-03-03 ASSESSMENT — PAIN DESCRIPTION - LOCATION: LOCATION: HAND

## 2025-03-03 ASSESSMENT — PAIN DESCRIPTION - DESCRIPTORS: DESCRIPTORS: DISCOMFORT;ACHING

## 2025-03-03 ASSESSMENT — PAIN SCALES - GENERAL: PAINLEVEL_OUTOF10: 6

## 2025-03-03 NOTE — ED PROVIDER NOTES
packs/day for 20.0 years (10.0 ttl pk-yrs)     Types: Cigars, Cigarettes    Smokeless tobacco: Former   Vaping Use    Vaping status: Never Used   Substance Use Topics    Alcohol use: Yes     Alcohol/week: 1.0 standard drink of alcohol     Types: 1 Cans of beer per week    Drug use: Not Currently       SCREENINGS   NIH Stroke Scale  NIH Stroke Scale Assessed: No    Anisha Coma Scale  Eye Opening: Spontaneous  Best Verbal Response: Oriented  Best Motor Response: Obeys commands  [place] Coma Scale Score: 15                CIWA Assessment  BP: (!) 144/73  Pulse: 94           PHYSICAL EXAM  1 or more Elements     ED Triage Vitals [03/03/25 1634]   BP Systolic BP Percentile Diastolic BP Percentile Temp Temp Source Pulse Respirations SpO2   (!) 144/73 -- -- 98.4 °F (36.9 °C) Oral 94 16 96 %      Height Weight - Scale         2.108 m (6' 11\") (!) 142.9 kg (315 lb)               Constitutional: The patient is comfortable, well appearing, non toxic. The patient is alert and oriented and conversant. Pleasant. Speech clear.     Head: The head is atraumatic and normocephalic.     Eyes: No discharge is present from the eyes. The sclera are normal.     ENT: The oropharynx is normal. The mouth is normal to inspection.     Neck: Normal range of motion is achieved in the neck.     Respiratory: Breath sounds are normal. There is no respiratory distress. Respirations easy and unlabored.     Cardiovascular: Heart shows a regular rate and rhythm without murmurs, rubs or gallops.     Musculoskeletal: compartments soft and easily compressible. There is no obvious compartment syndrome. The shoulder evaluation shows that both shoulders have no deformity or swelling. No clavicle tenderness or deformity. There is no upper or lower arm edema. Full flexion and extension of wrists, elbows, and shoulders. Able to wiggle fingers. Cap refill < 2 seconds to digits of affected limb.  Radial and brachial pulses 2+ bilaterally.  strength strong

## 2025-06-12 ENCOUNTER — OFFICE VISIT (OUTPATIENT)
Dept: PODIATRY | Age: 60
End: 2025-06-12

## 2025-06-12 VITALS
DIASTOLIC BLOOD PRESSURE: 74 MMHG | WEIGHT: 294 LBS | BODY MASS INDEX: 30.01 KG/M2 | SYSTOLIC BLOOD PRESSURE: 124 MMHG | TEMPERATURE: 97.4 F

## 2025-06-12 DIAGNOSIS — M79.675 PAIN IN LEFT TOE(S): ICD-10-CM

## 2025-06-12 DIAGNOSIS — I73.9 PERIPHERAL VASCULAR DISEASE, UNSPECIFIED: ICD-10-CM

## 2025-06-12 DIAGNOSIS — I82.4Y1 ACUTE DEEP VEIN THROMBOSIS (DVT) OF PROXIMAL VEIN OF RIGHT LOWER EXTREMITY (HCC): ICD-10-CM

## 2025-06-12 DIAGNOSIS — E11.9 ENCOUNTER FOR DIABETIC FOOT EXAM (HCC): Primary | ICD-10-CM

## 2025-06-12 DIAGNOSIS — M79.674 PAIN IN RIGHT TOE(S): ICD-10-CM

## 2025-06-12 DIAGNOSIS — B35.1 TINEA UNGUIUM: ICD-10-CM

## 2025-06-12 DIAGNOSIS — E11.41 DIABETIC MONONEUROPATHY ASSOCIATED WITH TYPE 2 DIABETES MELLITUS (HCC): ICD-10-CM

## 2025-06-12 NOTE — PROGRESS NOTES
[facility]  [address] [facility] [address]  Dept: [phone]  Dept Fax: [phone]  Loc: [phone]    DIABETIC NAIL PROGRESS NOTE  Date of patient's visit: 6/12/2025  Patient's Name:  Pa Lugo YOB: 1965            Patient Care Team:  Brady Venegas III, MD as PCP - General          Chief Complaint   Patient presents with    Nail Problem    Toe Pain     Pt was last seen 2021   Presents today for toenail issues  Brady Venegas III, MD  LOV 5/15/25    Bunions     Pt needs nail care       Subjective:   Pa Lugo comes to clinic for Nail Problem, Toe Pain (Pt was last seen 2021 /Presents today for toenail issues/Brady Venegas III, MD/LOV 5/15/25), and Bunions (Pt needs nail care)    he is a diabetic and states that diabetic foot exam painful nails patient is still waiting for bunion surgery in the future..  Pt currently has complaint of thickened, elongated nails that they cannot manage by themselves.   Pt's primary care physician is Brady Venegas III, MD last seen 5/15/2025   .   No results found for: \"LABA1C\"   Complains of numbness in the feet bilat.  Past Medical History:   Diagnosis Date    Asthma     DVT (deep venous thrombosis) ([facility])     right leg    Sleep apnea     Swelling of extremity, left     chronic left leg swelling       Allergies   Allergen Reactions    Pcn [Penicillins] Swelling and Rash    Sulfa Antibiotics Swelling and Rash     Current Outpatient Medications on File Prior to Visit   Medication Sig Dispense Refill    fluticasone (FLONASE) 50 MCG/ACT nasal spray 1 spray by Each Nostril route daily as needed for Rhinitis      albuterol sulfate HFA (VENTOLIN HFA) 108 (90 Base) MCG/ACT inhaler Inhale 2 puffs into the lungs every 6 hours as needed for Wheezing      warfarin (COUMADIN) 5 MG tablet Take 2 tablets by mouth daily (Patient taking differently: Take 2 tablets by

## 2025-09-04 ENCOUNTER — PROCEDURE VISIT (OUTPATIENT)
Dept: PODIATRY | Age: 60
End: 2025-09-04
Payer: COMMERCIAL

## 2025-09-04 VITALS
BODY MASS INDEX: 29.49 KG/M2 | SYSTOLIC BLOOD PRESSURE: 138 MMHG | DIASTOLIC BLOOD PRESSURE: 77 MMHG | WEIGHT: 289 LBS | TEMPERATURE: 97.6 F

## 2025-09-04 DIAGNOSIS — B35.1 TINEA UNGUIUM: Primary | ICD-10-CM

## 2025-09-04 DIAGNOSIS — M79.674 PAIN IN RIGHT TOE(S): ICD-10-CM

## 2025-09-04 DIAGNOSIS — I73.9 PERIPHERAL VASCULAR DISEASE, UNSPECIFIED: ICD-10-CM

## 2025-09-04 DIAGNOSIS — E11.41 DIABETIC MONONEUROPATHY ASSOCIATED WITH TYPE 2 DIABETES MELLITUS (HCC): ICD-10-CM

## 2025-09-04 DIAGNOSIS — E11.9 ENCOUNTER FOR DIABETIC FOOT EXAM (HCC): ICD-10-CM

## 2025-09-04 DIAGNOSIS — L84 CORN OR CALLUS: ICD-10-CM

## 2025-09-04 DIAGNOSIS — M79.675 PAIN IN LEFT TOE(S): ICD-10-CM

## 2025-09-04 DIAGNOSIS — R26.2 DIFFICULTY WALKING: ICD-10-CM

## 2025-09-04 PROCEDURE — 11055 PARING/CUTG B9 HYPRKER LES 1: CPT | Performed by: PODIATRIST

## 2025-09-04 PROCEDURE — 11721 DEBRIDE NAIL 6 OR MORE: CPT | Performed by: PODIATRIST
